# Patient Record
Sex: MALE | Race: WHITE | Employment: OTHER | URBAN - METROPOLITAN AREA
[De-identification: names, ages, dates, MRNs, and addresses within clinical notes are randomized per-mention and may not be internally consistent; named-entity substitution may affect disease eponyms.]

---

## 2017-02-27 ENCOUNTER — OFFICE VISIT (OUTPATIENT)
Dept: CARDIOLOGY CLINIC | Age: 72
End: 2017-02-27

## 2017-02-27 VITALS
DIASTOLIC BLOOD PRESSURE: 70 MMHG | BODY MASS INDEX: 28.28 KG/M2 | WEIGHT: 202 LBS | HEART RATE: 67 BPM | HEIGHT: 71 IN | SYSTOLIC BLOOD PRESSURE: 120 MMHG

## 2017-02-27 DIAGNOSIS — I10 ESSENTIAL HYPERTENSION, BENIGN: ICD-10-CM

## 2017-02-27 DIAGNOSIS — I25.10 ATHEROSCLEROSIS OF NATIVE CORONARY ARTERY OF NATIVE HEART WITHOUT ANGINA PECTORIS: Primary | ICD-10-CM

## 2017-02-27 DIAGNOSIS — E78.5 HYPERLIPIDEMIA, UNSPECIFIED HYPERLIPIDEMIA TYPE: ICD-10-CM

## 2017-02-27 DIAGNOSIS — I25.2 OLD MYOCARDIAL INFARCTION: ICD-10-CM

## 2017-02-27 DIAGNOSIS — I35.9 AORTIC VALVE DISORDER: ICD-10-CM

## 2017-02-27 DIAGNOSIS — I42.9 CARDIOMYOPATHY (HCC): ICD-10-CM

## 2017-02-27 NOTE — MR AVS SNAPSHOT
Visit Information Date & Time Provider Department Dept. Phone Encounter #  
 2/27/2017  8:45 AM Ramez Velazquez MD [facility] [phone] Follow-up Instructions Return in about 6 months (around 8/27/2017). Your Appointments 8/14/2017  8:30 AM  
Follow Up with Ramez Velazquez MD  
[facility] ([facility]) Appt Note: 6 month follow up  
 [address] [facility] [place] [address]  
  
   
 [address] [id number] [address] [address] Upcoming Health Maintenance Date Due Hepatitis C Screening 1945 DTaP/Tdap/Td series (1 - Tdap) 7/14/1968 FOBT Q 1 YEAR AGE 50-75 7/14/1997 ZOSTER VACCINE AGE 60> 7/14/2007 GLAUCOMA SCREENING Q2Y 7/14/2012 Pneumococcal 65+ Low/Medium Risk (1 of 2 - PCV13) 7/14/2012 MEDICARE YEARLY EXAM 7/14/2012 INFLUENZA AGE 9 TO ADULT 8/1/2016 Allergies as of 2/27/2017  Review Complete On: 2/27/2017 By: Ramez Velazquez MD  
 No Known Allergies Current Immunizations  Never Reviewed No immunizations on file. Not reviewed this visit You Were Diagnosed With   
  
 Codes Comments Atherosclerosis of native coronary artery of native heart without angina pectoris    -  Primary ICD-10-CM: I25.10 ICD-9-CM: 414.01 stable Cardiomyopathy ([address]     ICD-10-CM: I42.9 ICD-9-CM: 425.4 ef improving Old myocardial infarction     ICD-10-CM: I25.2 ICD-9-CM: 180 Aortic valve disorder     ICD-10-CM: I35.9 ICD-9-CM: 424.1 ai better on last echo Hyperlipidemia, unspecified hyperlipidemia type     ICD-10-CM: E78.5 ICD-9-CM: 272.4 Essential hypertension, benign     ICD-10-CM: I10 
ICD-9-CM: 401.1 controlled Vitals BP  
  
  
  
  
  
 120/70 BMI and BSA Data Body Mass Index Body Surface Area  
 28.17 kg/m 2 2.14 m 2 Preferred Pharmacy Pharmacy Name Phone [facility], [address] [phone] Your Updated Medication List  
  
   
This list is accurate as of: 2/27/17  9:14 AM.  Always use your most recent med list.  
  
  
  
  
 atorvastatin 40 mg tablet Commonly known as:  LIPITOR Take 1 Tab by mouth daily. carvedilol 25 mg tablet Commonly known as:  Zainab Locket Take 1 Tab by mouth two (2) times daily (with meals). clopidogrel 75 mg Tab Commonly known as:  PLAVIX Take 1 Tab by mouth daily. FISH OIL CONCENTRATE Cap Generic drug:  omega-3 fatty acids Take  by mouth.  
  
 lisinopril 20 mg tablet Commonly known as:  PRINIVIL, ZESTRIL  
take 1 tablet by mouth twice a day Follow-up Instructions Return in about 6 months (around 8/27/2017). Introducing [facility]! Jenny March introduces LoopIt patient portal. Now you can access parts of your medical record, email your doctor's office, and request medication refills online. 1. In your internet browser, go to [email or web address] 2. Click on the First Time User? Click Here link in the Sign In box. You will see the New Member Sign Up page. 3. Enter your LoopIt Access Code exactly as it appears below. You will not need to use this code after youve completed the sign-up process. If you do not sign up before the expiration date, you must request a new code. · LoopIt Access Code: [email or web address] Expires: 5/28/2017  8:40 AM 
 
4. Enter the last four digits of your Social Security Number (xxxx) and Date of Birth (mm/dd/yyyy) as indicated and click Submit. You will be taken to the next sign-up page. 5. Create a Tk20t ID. This will be your LoopIt login ID and cannot be changed, so think of one that is secure and easy to remember. 6. Create a LoopIt password. You can change your password at any time. 7. Enter your Password Reset Question and Answer.  This can be used at a later time if you forget your password. 8. Enter your e-mail address. You will receive e-mail notification when new information is available in [address] 9. Click Sign Up. You can now view and download portions of your medical record. 10. Click the Download Summary menu link to download a portable copy of your medical information. If you have questions, please visit the Frequently Asked Questions section of the [employer] website. Remember, [employer]aderTools is NOT to be used for urgent needs. For medical emergencies, dial 911. Now available from your iPhone and Android! Please provide this summary of care documentation to your next provider. Your primary care clinician is listed as Britney Ireland. If you have any questions after today's visit, please call [phone].

## 2017-02-27 NOTE — PROGRESS NOTES
HISTORY OF PRESENT ILLNESS  Xavier Hutchinson is a 73 y.o. male. HPI Comments: Patient with cad,cmp,old pci. On follow up patient denies any chest pains,sob, palpitation or other significant symptoms. Valvular Heart Disease   The history is provided by the patient. This is a chronic problem. The problem occurs constantly. The problem has not changed since onset. Pertinent negatives include no chest pain, no abdominal pain, no headaches and no shortness of breath. Review of Systems   Constitutional: Negative for chills and fever. HENT: Negative for nosebleeds. Eyes: Negative for blurred vision and double vision. Respiratory: Negative for cough, hemoptysis, sputum production, shortness of breath and wheezing. Cardiovascular: Negative for chest pain, palpitations, orthopnea, claudication, leg swelling and PND. Gastrointestinal: Negative for abdominal pain, heartburn, nausea and vomiting. Musculoskeletal: Negative for myalgias. Skin: Negative for rash. Neurological: Negative for dizziness, weakness and headaches. Endo/Heme/Allergies: Does not bruise/bleed easily. No family history on file. Past Medical History:   Diagnosis Date    Aortic valve disorders     Mild to moderate.  Coronary atherosclerosis of native coronary artery     Stable, old MI, old LAD, PCI.  Essential hypertension, benign     Stable.  Old myocardial infarction     Other and unspecified hyperlipidemia     LDL less than 100.  Other primary cardiomyopathies [facility])        Past Surgical History:   Procedure Laterality Date    HX CORONARY STENT PLACEMENT  2004    Prox LAD stent, Mid LAD PTCA       No Known Allergies    Current Outpatient Prescriptions   Medication Sig    clopidogrel (PLAVIX) 75 mg tablet Take 1 Tab by mouth daily.  carvedilol (COREG) 25 mg tablet Take 1 Tab by mouth two (2) times daily (with meals).     lisinopril (PRINIVIL, ZESTRIL) 20 mg tablet take 1 tablet by mouth twice a day    atorvastatin (LIPITOR) 40 mg tablet Take 1 Tab by mouth daily.  omega-3 fatty acids (FISH OIL CONCENTRATE) cap Take  by mouth. No current facility-administered medications for this visit. Visit Vitals    /70    Pulse 67    Ht 5' 11\" (1.803 m)    Wt 91.6 kg (202 lb)    BMI 28.17 kg/m2         Physical Exam   Constitutional: He is oriented to person, place, and time. He appears well-developed and well-nourished. HENT:   Head: Normocephalic and atraumatic. Eyes: Conjunctivae are normal.   Neck: Neck supple. No JVD present. No tracheal deviation present. No thyromegaly present. Cardiovascular: Normal rate, regular rhythm and normal heart sounds. Exam reveals no gallop and no friction rub. No murmur heard. Pulmonary/Chest: Breath sounds normal. No respiratory distress. He has no wheezes. He has no rales. He exhibits no tenderness. Abdominal: Soft. There is no tenderness. Musculoskeletal: He exhibits no edema. Neurological: He is alert and oriented to person, place, and time. Skin: Skin is warm and dry. Psychiatric: He has a normal mood and affect. Mr. Татьяна Sewell has a reminder for a \"due or due soon\" health maintenance. I have asked that he contact his primary care provider for follow-up on this health maintenance. CARDIOLOGY STUDIES 9/1/2011 5/1/2011 4/1/2007 10/1/2005 8/1/2004 4/1/2004   Myocardial Perfusion Scan Result - Abn scan, large fixed defect involving anterior, apical, and septal area, EF 55%. - - Abn scan, fixed anteroseptal apical and part of inferior wall defect, EF 34% -   Cardiac Cath with PCI Result - - - - - Taxus stent in left anterior descending and a balloon angio in mid left anterior descending CA. Echocardiogram - Complete Result EF 45-50%, mild/mod AR.  EF 39%, mild AR, mild TR, trace MR Dilated LV, EF 45% EF 42%, PAP 29, mild AR, mild MR, mild TR EF 45% -     SUMMARY:8/2014  Procedure information: Intravenous contrast (Definity, 3) was administered  to evaluate myocardial perfusion and opacify the left ventricle. Left ventricle: The ventricle was moderately dilated. Systolic function  was moderately reduced. Ejection fraction was estimated in the range of 30  % to 35 %. There was moderate diffuse hypokinesis. Features were  consistent with a pseudonormal left ventricular filling pattern, with  concomitant abnormal relaxation and increased filling pressure (grade 2  diastolic dysfunction). AORTIC VALVE: The valve was trileaflet. Leaflets exhibited normal  thickness. DOPPLER: There was mild regurgitation. I Have personally reviewed recent relevant labs available and discussed with patient  8/2015-lipid,lft,8/2016    SUMMARY:echo-8/2016  Left ventricle: The ventricle was moderately dilated. Systolic function  was mildly reduced. Ejection fraction was estimated in the range of 40 %  to 45 %. There were no regional wall motion abnormalities. There was mild  diffuse hypokinesis. Doppler parameters were consistent with abnormal left  ventricular relaxation (grade 1 diastolic dysfunction). Left atrium: The atrium was mildly dilated. Mitral valve: There was mild annular calcification. There was trivial  regurgitation. Tricuspid valve: Pulmonary artery systolic pressure: 19 mmHg. Aorta, systemic arteries: The root exhibited mild dilatation. There was  mild dilatation of the ascending aorta. The aortic root diameter was 41  mm. The ascending aortic AP dimension was 39 mm. Assessment       ICD-10-CM ICD-9-CM    1. Atherosclerosis of native coronary artery of native heart without angina pectoris I25.10 414.01     stable   2. Cardiomyopathy ([address] I42.9 425.4     ef improving   3. Old myocardial infarction I25.2 412    4. Aortic valve disorder I35.9 424.1     ai better on last echo   5. Hyperlipidemia, unspecified hyperlipidemia type E78.5 272.4    6.  Essential hypertension, benign I10 401.1     controlled   Patient does not want icd at this time-lv function improving    There are no discontinued medications. No orders of the defined types were placed in this encounter. Follow-up Disposition:  Return in about 6 months (around 8/27/2017).

## 2017-02-27 NOTE — PROGRESS NOTES
1. Have you been to the ER, urgent care clinic since your last visit? Hospitalized since your last visit? No    2. Have you seen or consulted any other health care providers outside of the [address] [place] [address] since your last visit? Include any pap smears or colon screening. Yes Where: pcp     3. Since your last visit, have you had any of the following symptoms? no         4. Have you had any blood work, X-rays or cardiac testing? No         5. Where do you normally have your labs drawn? 6. Do you need any refills today?    no

## 2017-02-27 NOTE — LETTER
Divya Alex 1945 2/27/2017 Dear Luca Anthony MD 
 
I had the pleasure of evaluating  Mr. Julio César Culver in office today. Below are the relevant portions of my assessment and plan of care. ICD-10-CM ICD-9-CM 1. Atherosclerosis of native coronary artery of native heart without angina pectoris I25.10 414.01   
 stable 2. Cardiomyopathy ([address] I42.9 425.4   
 ef improving 3. Old myocardial infarction I25.2 412   
4. Aortic valve disorder I35.9 424.1   
 ai better on last echo 5. Hyperlipidemia, unspecified hyperlipidemia type E78.5 272.4 6. Essential hypertension, benign I10 401.1   
 controlled Current Outpatient Prescriptions Medication Sig Dispense Refill  clopidogrel (PLAVIX) 75 mg tablet Take 1 Tab by mouth daily. 90 Tab 3  carvedilol (COREG) 25 mg tablet Take 1 Tab by mouth two (2) times daily (with meals). 180 Tab 3  
 lisinopril (PRINIVIL, ZESTRIL) 20 mg tablet take 1 tablet by mouth twice a day 180 Tab 2  
 atorvastatin (LIPITOR) 40 mg tablet Take 1 Tab by mouth daily. 90 Tab 3  
 omega-3 fatty acids (FISH OIL CONCENTRATE) cap Take  by mouth. No orders of the defined types were placed in this encounter. If you have questions, please do not hesitate to call me. I look forward to following Mr. Julio César Culver along with you. Sincerely, Kenzie Frey MD

## 2017-03-23 DIAGNOSIS — E78.5 HYPERLIPIDEMIA, UNSPECIFIED HYPERLIPIDEMIA TYPE: ICD-10-CM

## 2017-03-24 RX ORDER — ATORVASTATIN CALCIUM 40 MG/1
TABLET, FILM COATED ORAL
Qty: 90 TAB | Refills: 3 | Status: SHIPPED | OUTPATIENT
Start: 2017-03-24 | End: 2018-03-02 | Stop reason: SDUPTHER

## 2017-05-16 RX ORDER — LISINOPRIL 20 MG/1
TABLET ORAL
Qty: 180 TAB | Refills: 6 | Status: SHIPPED | OUTPATIENT
Start: 2017-05-16 | End: 2018-05-16 | Stop reason: SDUPTHER

## 2017-06-19 ENCOUNTER — TELEPHONE (OUTPATIENT)
Dept: CARDIOLOGY CLINIC | Age: 72
End: 2017-06-19

## 2017-06-19 NOTE — TELEPHONE ENCOUNTER
Patient called and stated he would like to know if it ok to take benadryl with his heart mediations. Please advise.

## 2017-08-14 ENCOUNTER — OFFICE VISIT (OUTPATIENT)
Dept: CARDIOLOGY CLINIC | Age: 72
End: 2017-08-14

## 2017-08-14 VITALS
HEIGHT: 71 IN | SYSTOLIC BLOOD PRESSURE: 134 MMHG | WEIGHT: 204 LBS | BODY MASS INDEX: 28.56 KG/M2 | DIASTOLIC BLOOD PRESSURE: 74 MMHG | HEART RATE: 65 BPM

## 2017-08-14 DIAGNOSIS — I35.9 AORTIC VALVE DISORDER: ICD-10-CM

## 2017-08-14 DIAGNOSIS — I42.9 CARDIOMYOPATHY, UNSPECIFIED TYPE (HCC): ICD-10-CM

## 2017-08-14 DIAGNOSIS — I10 ESSENTIAL HYPERTENSION, BENIGN: ICD-10-CM

## 2017-08-14 DIAGNOSIS — E78.5 HYPERLIPIDEMIA, UNSPECIFIED HYPERLIPIDEMIA TYPE: ICD-10-CM

## 2017-08-14 DIAGNOSIS — I25.10 ATHEROSCLEROSIS OF NATIVE CORONARY ARTERY OF NATIVE HEART WITHOUT ANGINA PECTORIS: Primary | ICD-10-CM

## 2017-08-14 DIAGNOSIS — I25.2 OLD MYOCARDIAL INFARCTION: ICD-10-CM

## 2017-08-14 RX ORDER — CARVEDILOL 25 MG/1
25 TABLET ORAL 2 TIMES DAILY WITH MEALS
Qty: 180 TAB | Refills: 3 | Status: SHIPPED | OUTPATIENT
Start: 2017-08-14 | End: 2018-09-06 | Stop reason: SDUPTHER

## 2017-08-14 RX ORDER — CLOPIDOGREL BISULFATE 75 MG/1
75 TABLET ORAL DAILY
Qty: 90 TAB | Refills: 3 | Status: SHIPPED | OUTPATIENT
Start: 2017-08-14 | End: 2018-03-02 | Stop reason: SDUPTHER

## 2017-08-14 NOTE — PROGRESS NOTES
HISTORY OF PRESENT ILLNESS  Sukh Bliss is a 81 y.o. male. HPI Comments: Patient with cad,cmp,old pci. On follow up patient denies any chest pains,sob, palpitation or other significant symptoms. Hypertension   The history is provided by the patient. This is a chronic problem. The problem occurs constantly. The problem has not changed since onset. Pertinent negatives include no chest pain, no abdominal pain, no headaches and no shortness of breath. Cholesterol Problem   Pertinent negatives include no chest pain, no abdominal pain, no headaches and no shortness of breath. Valvular Heart Disease   The history is provided by the patient. This is a chronic problem. The problem occurs constantly. The problem has not changed since onset. Pertinent negatives include no chest pain, no abdominal pain, no headaches and no shortness of breath. Review of Systems   Constitutional: Negative for chills and fever. HENT: Negative for nosebleeds. Eyes: Negative for blurred vision and double vision. Respiratory: Negative for cough, hemoptysis, sputum production, shortness of breath and wheezing. Cardiovascular: Negative for chest pain, palpitations, orthopnea, claudication, leg swelling and PND. Gastrointestinal: Negative for abdominal pain, heartburn, nausea and vomiting. Musculoskeletal: Negative for myalgias. Skin: Negative for rash. Neurological: Negative for dizziness, weakness and headaches. Endo/Heme/Allergies: Does not bruise/bleed easily. No family history on file. Past Medical History:   Diagnosis Date    Aortic valve disorders     Mild to moderate.  Coronary atherosclerosis of native coronary artery     Stable, old MI, old LAD, PCI.  Essential hypertension, benign     Stable.  Old myocardial infarction     Other and unspecified hyperlipidemia     LDL less than 100.     Other primary cardiomyopathies [facility])        Past Surgical History:   Procedure Laterality Date    HX CORONARY STENT PLACEMENT  2004    Prox LAD stent, Mid LAD PTCA       No Known Allergies    Current Outpatient Prescriptions   Medication Sig    clopidogrel (PLAVIX) 75 mg tab Take 1 Tab by mouth daily.  carvedilol (COREG) 25 mg tablet Take 1 Tab by mouth two (2) times daily (with meals).  lisinopril (PRINIVIL, ZESTRIL) 20 mg tablet take 1 tablet by mouth twice a day    atorvastatin (LIPITOR) 40 mg tablet take 1 tablet by mouth once daily    omega-3 fatty acids (FISH OIL CONCENTRATE) cap Take  by mouth. No current facility-administered medications for this visit. Visit Vitals    /74    Pulse 65    Ht 5' 11\" (1.803 m)    Wt 92.5 kg (204 lb)    BMI 28.45 kg/m2         Physical Exam   Constitutional: He is oriented to person, place, and time. He appears well-developed and well-nourished. HENT:   Head: Normocephalic and atraumatic. Eyes: Conjunctivae are normal.   Neck: Neck supple. No JVD present. No tracheal deviation present. No thyromegaly present. Cardiovascular: Normal rate, regular rhythm and normal heart sounds. Exam reveals no gallop and no friction rub. No murmur heard. Pulmonary/Chest: Breath sounds normal. No respiratory distress. He has no wheezes. He has no rales. He exhibits no tenderness. Abdominal: Soft. There is no tenderness. Musculoskeletal: He exhibits no edema. Neurological: He is alert and oriented to person, place, and time. Skin: Skin is warm and dry. Psychiatric: He has a normal mood and affect. Mr. Alexander Shepherd has a reminder for a \"due or due soon\" health maintenance. I have asked that he contact his primary care provider for follow-up on this health maintenance. CARDIOLOGY STUDIES 9/1/2011 5/1/2011 4/1/2007 10/1/2005 8/1/2004 4/1/2004   Myocardial Perfusion Scan Result - Abn scan, large fixed defect involving anterior, apical, and septal area, EF 55%.  - - Abn scan, fixed anteroseptal apical and part of inferior wall defect, EF 34% - Cardiac Cath with PCI Result - - - - - Taxus stent in left anterior descending and a balloon angio in mid left anterior descending CA. Echocardiogram - Complete Result EF 45-50%, mild/mod AR. EF 39%, mild AR, mild TR, trace MR Dilated LV, EF 45% EF 42%, PAP 29, mild AR, mild MR, mild TR EF 45% -     SUMMARY:8/2014  Procedure information: Intravenous contrast (Definity, 3) was administered  to evaluate myocardial perfusion and opacify the left ventricle. Left ventricle: The ventricle was moderately dilated. Systolic function  was moderately reduced. Ejection fraction was estimated in the range of 30  % to 35 %. There was moderate diffuse hypokinesis. Features were  consistent with a pseudonormal left ventricular filling pattern, with  concomitant abnormal relaxation and increased filling pressure (grade 2  diastolic dysfunction). AORTIC VALVE: The valve was trileaflet. Leaflets exhibited normal  thickness. DOPPLER: There was mild regurgitation. I Have personally reviewed recent relevant labs available and discussed with patient  8/2015-lipid,lft,8/2016    SUMMARY:echo-8/2016  Left ventricle: The ventricle was moderately dilated. Systolic function  was mildly reduced. Ejection fraction was estimated in the range of 40 %  to 45 %. There were no regional wall motion abnormalities. There was mild  diffuse hypokinesis. Doppler parameters were consistent with abnormal left  ventricular relaxation (grade 1 diastolic dysfunction). Left atrium: The atrium was mildly dilated. Mitral valve: There was mild annular calcification. There was trivial  regurgitation. Tricuspid valve: Pulmonary artery systolic pressure: 19 mmHg. Aorta, systemic arteries: The root exhibited mild dilatation. There was  mild dilatation of the ascending aorta. The aortic root diameter was 41  mm. The ascending aortic AP dimension was 39 mm. Assessment       ICD-10-CM ICD-9-CM    1.  Atherosclerosis of native coronary artery of native heart without angina pectoris I25.10 414.01 CBC WITH AUTOMATED DIFF      METABOLIC PANEL, BASIC    stable   2. Cardiomyopathy, unspecified type (HCC) I42.9 425.4     stable  mild   3. Essential hypertension, benign I10 401.1     controlled   4. Hyperlipidemia, unspecified hyperlipidemia type E78.5 272.4 LIPID PANEL      HEPATIC FUNCTION PANEL    stable   5. Aortic valve disorder I35.9 424.1     stable  asymptomatic   6. Old myocardial infarction I25.2 412    Patient does not want icd at this time-lv function improving    Medications Discontinued During This Encounter   Medication Reason    clopidogrel (PLAVIX) 75 mg tablet Reorder    carvedilol (COREG) 25 mg tablet Reorder       Orders Placed This Encounter    LIPID PANEL     Standing Status:   Future     Standing Expiration Date:   2/12/2018    HEPATIC FUNCTION PANEL     Standing Status:   Future     Standing Expiration Date:   2/12/2018    CBC WITH AUTOMATED DIFF     Standing Status:   Future     Standing Expiration Date:   8/49/6667    METABOLIC PANEL, BASIC     Standing Status:   Future     Standing Expiration Date:   9/13/2017    clopidogrel (PLAVIX) 75 mg tab     Sig: Take 1 Tab by mouth daily. Dispense:  90 Tab     Refill:  3    carvedilol (COREG) 25 mg tablet     Sig: Take 1 Tab by mouth two (2) times daily (with meals). Dispense:  180 Tab     Refill:  3       Follow-up Disposition:  Return in about 6 months (around 2/14/2018).

## 2017-08-14 NOTE — PROGRESS NOTES
1. Have you been to the ER, urgent care clinic since your last visit? Hospitalized since your last visit?    no    2. Have you seen or consulted any other health care providers outside of the [address] [place] [address] since your last visit? Include any pap smears or colon screening. no    3. Since your last visit, have you had any of the following symptoms?          no cardiac symptoms    4. Have you had any blood work, X-rays or cardiac testing?    no           5. Where do you normally have your labs drawn? [email or web address]    6. Do you need any refills today?    yes

## 2017-08-14 NOTE — MR AVS SNAPSHOT
Visit Information Date & Time Provider Department Dept. Phone Encounter #  
 8/14/2017  8:30 AM Nissa Amos MD [facility] [phone] Follow-up Instructions Return in about 6 months (around 2/14/2018). Upcoming Health Maintenance Date Due Hepatitis C Screening 1945 DTaP/Tdap/Td series (1 - Tdap) 7/14/1968 FOBT Q 1 YEAR AGE 50-75 7/14/1997 ZOSTER VACCINE AGE 60> 5/14/2007 GLAUCOMA SCREENING Q2Y 7/14/2012 Pneumococcal 65+ Low/Medium Risk (1 of 2 - PCV13) 7/14/2012 MEDICARE YEARLY EXAM 7/14/2012 INFLUENZA AGE 9 TO ADULT 8/1/2017 Allergies as of 8/14/2017  Review Complete On: 8/14/2017 By: Nissa Amos MD  
 No Known Allergies Current Immunizations  Never Reviewed No immunizations on file. Not reviewed this visit You Were Diagnosed With   
  
 Codes Comments Atherosclerosis of native coronary artery of native heart without angina pectoris    -  Primary ICD-10-CM: I25.10 ICD-9-CM: 414.01 stable Cardiomyopathy, unspecified type ([facility]     ICD-10-CM: I42.9 ICD-9-CM: 425.4 stable 
mild Essential hypertension, benign     ICD-10-CM: I10 
ICD-9-CM: 401.1 controlled Hyperlipidemia, unspecified hyperlipidemia type     ICD-10-CM: E78.5 ICD-9-CM: 272.4 stable Aortic valve disorder     ICD-10-CM: I35.9 ICD-9-CM: 424.1 stable 
asymptomatic Old myocardial infarction     ICD-10-CM: I25.2 ICD-9-CM: 524 Vitals BP Pulse Height(growth percentile) Weight(growth percentile) BMI Smoking Status 134/74 65 5' 11\" (1.803 m) 204 lb (92.5 kg) 28.45 kg/m2 Never Smoker Vitals History BMI and BSA Data Body Mass Index Body Surface Area  
 28.45 kg/m 2 2.15 m 2 Preferred Pharmacy Pharmacy Name Phone [facility] [address] [phone] Your Updated Medication List  
  
   
 This list is accurate as of: 8/14/17  8:55 AM.  Always use your most recent med list.  
  
  
  
  
 atorvastatin 40 mg tablet Commonly known as:  LIPITOR  
take 1 tablet by mouth once daily  
  
 carvedilol 25 mg tablet Commonly known as:  Yesica Salts Take 1 Tab by mouth two (2) times daily (with meals). clopidogrel 75 mg Tab Commonly known as:  PLAVIX Take 1 Tab by mouth daily. FISH OIL CONCENTRATE Cap Generic drug:  omega-3 fatty acids Take  by mouth.  
  
 lisinopril 20 mg tablet Commonly known as:  PRINIVIL, ZESTRIL  
take 1 tablet by mouth twice a day Prescriptions Sent to Pharmacy Refills  
 clopidogrel (PLAVIX) 75 mg tab 3 Sig: Take 1 Tab by mouth daily. Class: Normal  
 Pharmacy: [id number] [place] [address],[address], [address] Ph #: [phone] Route: Oral  
 carvedilol (COREG) 25 mg tablet 3 Sig: Take 1 Tab by mouth two (2) times daily (with meals). Class: Normal  
 Pharmacy: [id number] [place] [address],[address], [address] Ph #: [phone] Route: Oral  
  
Follow-up Instructions Return in about 6 months (around 2/14/2018). To-Do List   
 08/14/2017 Lab:  HEPATIC FUNCTION PANEL   
  
 08/14/2017 Lab:  LIPID PANEL   
  
 08/21/2017 Lab:  CBC WITH AUTOMATED DIFF   
  
 08/21/2017 Lab:  METABOLIC PANEL, BASIC Introducing [facility]! Brittnee Crenshaw introduces LATTO patient portal. Now you can access parts of your medical record, email your doctor's office, and request medication refills online. 1. In your internet browser, go to [email or web address] 2. Click on the First Time User? Click Here link in the Sign In box. You will see the New Member Sign Up page. 3. Enter your LATTO Access Code exactly as it appears below. You will not need to use this code after youve completed the sign-up process.  If you do not sign up before the expiration date, you must request a new code. · BackTrack Access Code: [email or web address] Expires: 11/12/2017  8:55 AM 
 
4. Enter the last four digits of your Social Security Number (xxxx) and Date of Birth (mm/dd/yyyy) as indicated and click Submit. You will be taken to the next sign-up page. 5. Create a BackTrack ID. This will be your BackTrack login ID and cannot be changed, so think of one that is secure and easy to remember. 6. Create a BackTrack password. You can change your password at any time. 7. Enter your Password Reset Question and Answer. This can be used at a later time if you forget your password. 8. Enter your e-mail address. You will receive e-mail notification when new information is available in [address] 9. Click Sign Up. You can now view and download portions of your medical record. 10. Click the Download Summary menu link to download a portable copy of your medical information. If you have questions, please visit the Frequently Asked Questions section of the BackTrack website. Remember, BackTrack is NOT to be used for urgent needs. For medical emergencies, dial 911. Now available from your iPhone and Android! Please provide this summary of care documentation to your next provider. Your primary care clinician is listed as Micaela Fu. If you have any questions after today's visit, please call [phone].

## 2017-08-14 NOTE — LETTER
[place] Alyx 1945 8/14/2017 Dear Elie Perez MD 
 
I had the pleasure of evaluating  Mr. Nando Dior in office today. Below are the relevant portions of my assessment and plan of care. ICD-10-CM ICD-9-CM 1. Atherosclerosis of native coronary artery of native heart without angina pectoris I25.10 414.01 CBC WITH AUTOMATED DIFF  
   METABOLIC PANEL, BASIC  
 stable 2. Cardiomyopathy, unspecified type ([address] I42.9 425.4   
 stable 
mild 3. Essential hypertension, benign I10 401.1   
 controlled 4. Hyperlipidemia, unspecified hyperlipidemia type E78.5 272.4 LIPID PANEL  
   HEPATIC FUNCTION PANEL  
 stable 5. Aortic valve disorder I35.9 424.1   
 stable 
asymptomatic 6. Old myocardial infarction I25.2 412 Current Outpatient Prescriptions Medication Sig Dispense Refill  clopidogrel (PLAVIX) 75 mg tab Take 1 Tab by mouth daily. 90 Tab 3  carvedilol (COREG) 25 mg tablet Take 1 Tab by mouth two (2) times daily (with meals). 180 Tab 3  
 lisinopril (PRINIVIL, ZESTRIL) 20 mg tablet take 1 tablet by mouth twice a day 180 Tab 6  
 atorvastatin (LIPITOR) 40 mg tablet take 1 tablet by mouth once daily 90 Tab 3  
 omega-3 fatty acids (FISH OIL CONCENTRATE) cap Take  by mouth. Orders Placed This Encounter  LIPID PANEL Standing Status:   Future Standing Expiration Date:   2/12/2018  
 HEPATIC FUNCTION PANEL Standing Status:   Future Standing Expiration Date:   2/12/2018  CBC WITH AUTOMATED DIFF Standing Status:   Future Standing Expiration Date:   9/13/2017  METABOLIC PANEL, BASIC Standing Status:   Future Standing Expiration Date:   9/13/2017  clopidogrel (PLAVIX) 75 mg tab Sig: Take 1 Tab by mouth daily. Dispense:  90 Tab Refill:  3  carvedilol (COREG) 25 mg tablet Sig: Take 1 Tab by mouth two (2) times daily (with meals). Dispense:  180 Tab Refill:  3 If you have questions, please do not hesitate to call me. I look forward to following . Jane Callaway along with you. Sincerely, Shivam Matos MD

## 2017-08-17 RX ORDER — CLOPIDOGREL BISULFATE 75 MG/1
TABLET ORAL
Qty: 90 TAB | Refills: 3 | Status: SHIPPED | OUTPATIENT
Start: 2017-08-17 | End: 2018-11-07 | Stop reason: SDUPTHER

## 2017-08-21 LAB
A-G RATIO,AGRAT: 1.1 RATIO (ref 1.1–2.6)
ABSOLUTE LYMPHOCYTE COUNT, 10803: 0.8 K/UL (ref 1–4.8)
ALBUMIN SERPL-MCNC: 3.6 G/DL (ref 3.5–5)
ALP SERPL-CCNC: 70 U/L (ref 40–125)
ALT SERPL-CCNC: 16 U/L (ref 5–40)
ANION GAP SERPL CALC-SCNC: 10.4 MMOL/L
AST SERPL W P-5'-P-CCNC: 20 U/L (ref 10–37)
BASOPHILS # BLD: 0 K/UL (ref 0–0.2)
BASOPHILS NFR BLD: 1 % (ref 0–2)
BILIRUB SERPL-MCNC: 0.5 MG/DL (ref 0.2–1.2)
BILIRUBIN, DIRECT,CBIL: <0.2 MG/DL (ref 0–0.3)
BUN SERPL-MCNC: 26 MG/DL (ref 6–22)
CALCIUM SERPL-MCNC: 8.8 MG/DL (ref 8.4–10.4)
CHLORIDE SERPL-SCNC: 107 MMOL/L (ref 98–110)
CHOLEST SERPL-MCNC: 134 MG/DL (ref 110–200)
CO2 SERPL-SCNC: 23 MMOL/L (ref 20–32)
CREAT SERPL-MCNC: 1.1 MG/DL (ref 0.8–1.6)
EOSINOPHIL # BLD: 0.2 K/UL (ref 0–0.5)
EOSINOPHIL NFR BLD: 3 % (ref 0–6)
ERYTHROCYTE [DISTWIDTH] IN BLOOD BY AUTOMATED COUNT: 13.9 % (ref 10–16)
GFRAA, 66117: >60
GFRNA, 66118: >60
GLOBULIN,GLOB: 3.2 G/DL (ref 2–4)
GLUCOSE SERPL-MCNC: 93 MG/DL (ref 65–99)
GRANULOCYTES,GRANS: 73 % (ref 40–75)
HCT VFR BLD AUTO: 41.7 % (ref 37.8–52.2)
HDLC SERPL-MCNC: 27 MG/DL (ref 40–59)
HGB BLD-MCNC: 13.5 G/DL (ref 12.6–17.1)
LDLC SERPL CALC-MCNC: 70 MG/DL (ref 50–99)
LYMPHOCYTES, LYMLT: 14 % (ref 27–45)
MCH RBC QN AUTO: 28 PG (ref 26–34)
MCHC RBC AUTO-ENTMCNC: 32 G/DL (ref 32–36)
MCV RBC AUTO: 85 FL (ref 80–95)
MONOCYTES # BLD: 0.6 K/UL (ref 0.1–0.9)
MONOCYTES NFR BLD: 10 % (ref 3–9)
NEUTROPHILS # BLD AUTO: 4.3 K/UL (ref 1.8–7.7)
PLATELET # BLD AUTO: 203 K/UL (ref 140–440)
PMV BLD AUTO: 11.7 FL (ref 6–10.8)
POTASSIUM SERPL-SCNC: 4.5 MMOL/L (ref 3.5–5.5)
PROT SERPL-MCNC: 6.8 G/DL (ref 6.2–8.1)
RBC # BLD AUTO: 4.91 M/UL (ref 3.8–5.8)
SODIUM SERPL-SCNC: 140 MMOL/L (ref 133–145)
TRIGL SERPL-MCNC: 188 MG/DL (ref 40–149)
VLDLC SERPL CALC-MCNC: 38 MG/DL (ref 8–30)
WBC # BLD AUTO: 5.9 K/UL (ref 4–11)

## 2018-03-02 ENCOUNTER — OFFICE VISIT (OUTPATIENT)
Dept: CARDIOLOGY CLINIC | Age: 73
End: 2018-03-02

## 2018-03-02 VITALS
BODY MASS INDEX: 28.98 KG/M2 | WEIGHT: 207 LBS | HEIGHT: 71 IN | SYSTOLIC BLOOD PRESSURE: 121 MMHG | DIASTOLIC BLOOD PRESSURE: 66 MMHG | HEART RATE: 63 BPM

## 2018-03-02 DIAGNOSIS — I10 ESSENTIAL HYPERTENSION, BENIGN: ICD-10-CM

## 2018-03-02 DIAGNOSIS — E78.5 HYPERLIPIDEMIA, UNSPECIFIED HYPERLIPIDEMIA TYPE: ICD-10-CM

## 2018-03-02 DIAGNOSIS — I42.9 CARDIOMYOPATHY, UNSPECIFIED TYPE (HCC): ICD-10-CM

## 2018-03-02 DIAGNOSIS — I25.2 OLD MYOCARDIAL INFARCTION: ICD-10-CM

## 2018-03-02 DIAGNOSIS — I25.10 ATHEROSCLEROSIS OF NATIVE CORONARY ARTERY OF NATIVE HEART WITHOUT ANGINA PECTORIS: Primary | ICD-10-CM

## 2018-03-02 RX ORDER — ATORVASTATIN CALCIUM 40 MG/1
TABLET, FILM COATED ORAL
Qty: 90 TAB | Refills: 3 | Status: SHIPPED | OUTPATIENT
Start: 2018-03-02 | End: 2019-03-19 | Stop reason: SDUPTHER

## 2018-03-02 NOTE — PROGRESS NOTES
HISTORY OF PRESENT ILLNESS  Awilda Carty is a 81 y.o. male. HPI Comments: Patient with cad,cmp,old pci. On follow up patient denies any chest pains,sob, palpitation or other significant symptoms. Hypertension   The history is provided by the patient. This is a chronic problem. The problem occurs constantly. The problem has not changed since onset. Pertinent negatives include no chest pain, no abdominal pain, no headaches and no shortness of breath. Cholesterol Problem   The history is provided by the patient. This is a chronic problem. The problem occurs constantly. The problem has not changed since onset. Pertinent negatives include no chest pain, no abdominal pain, no headaches and no shortness of breath. Valvular Heart Disease   The history is provided by the patient. This is a chronic problem. The problem occurs constantly. The problem has not changed since onset. Pertinent negatives include no chest pain, no abdominal pain, no headaches and no shortness of breath. Review of Systems   Constitutional: Negative for chills and fever. HENT: Negative for nosebleeds. Eyes: Negative for blurred vision and double vision. Respiratory: Negative for cough, hemoptysis, sputum production, shortness of breath and wheezing. Cardiovascular: Negative for chest pain, palpitations, orthopnea, claudication, leg swelling and PND. Gastrointestinal: Negative for abdominal pain, heartburn, nausea and vomiting. Musculoskeletal: Negative for myalgias. Skin: Negative for rash. Neurological: Negative for dizziness, weakness and headaches. Endo/Heme/Allergies: Does not bruise/bleed easily. No family history on file. Past Medical History:   Diagnosis Date    Aortic valve disorders     Mild to moderate.  Coronary atherosclerosis of native coronary artery     Stable, old MI, old LAD, PCI.  Essential hypertension, benign     Stable.     Old myocardial infarction     Other and unspecified hyperlipidemia     LDL less than 100.  Other primary cardiomyopathies [facility])        Past Surgical History:   Procedure Laterality Date    HX CORONARY STENT PLACEMENT  2004    Prox LAD stent, Mid LAD PTCA       No Known Allergies    Current Outpatient Prescriptions   Medication Sig    atorvastatin (LIPITOR) 40 mg tablet take 1 tablet by mouth once daily    clopidogrel (PLAVIX) 75 mg tab take 1 tablet by mouth once daily    carvedilol (COREG) 25 mg tablet Take 1 Tab by mouth two (2) times daily (with meals).  lisinopril (PRINIVIL, ZESTRIL) 20 mg tablet take 1 tablet by mouth twice a day    omega-3 fatty acids (FISH OIL CONCENTRATE) cap Take  by mouth. No current facility-administered medications for this visit. Visit Vitals    /66    Pulse 63    Ht 5' 11\" (1.803 m)    Wt 93.9 kg (207 lb)    BMI 28.87 kg/m2         Physical Exam   Constitutional: He is oriented to person, place, and time. He appears well-developed and well-nourished. HENT:   Head: Normocephalic and atraumatic. Eyes: Conjunctivae are normal.   Neck: Neck supple. No JVD present. No tracheal deviation present. No thyromegaly present. Cardiovascular: Normal rate, regular rhythm and normal heart sounds. Exam reveals no gallop and no friction rub. No murmur heard. Pulmonary/Chest: Breath sounds normal. No respiratory distress. He has no wheezes. He has no rales. He exhibits no tenderness. Abdominal: Soft. There is no tenderness. Musculoskeletal: He exhibits no edema. Neurological: He is alert and oriented to person, place, and time. Skin: Skin is warm and dry. Psychiatric: He has a normal mood and affect. Mr. Sam Lanza has a reminder for a \"due or due soon\" health maintenance. I have asked that he contact his primary care provider for follow-up on this health maintenance.     CARDIOLOGY STUDIES 9/1/2011 5/1/2011 4/1/2007 10/1/2005 8/1/2004 4/1/2004   Myocardial Perfusion Scan Result - Abn scan, large fixed defect involving anterior, apical, and septal area, EF 55%. - - Abn scan, fixed anteroseptal apical and part of inferior wall defect, EF 34% -   Cardiac Cath with PCI Result - - - - - Taxus stent in left anterior descending and a balloon angio in mid left anterior descending CA. Echocardiogram - Complete Result EF 45-50%, mild/mod AR. EF 39%, mild AR, mild TR, trace MR Dilated LV, EF 45% EF 42%, PAP 29, mild AR, mild MR, mild TR EF 45% -   Some recent data might be hidden     SUMMARY:8/2014  Procedure information: Intravenous contrast (Definity, 3) was administered  to evaluate myocardial perfusion and opacify the left ventricle. Left ventricle: The ventricle was moderately dilated. Systolic function  was moderately reduced. Ejection fraction was estimated in the range of 30  % to 35 %. There was moderate diffuse hypokinesis. Features were  consistent with a pseudonormal left ventricular filling pattern, with  concomitant abnormal relaxation and increased filling pressure (grade 2  diastolic dysfunction). AORTIC VALVE: The valve was trileaflet. Leaflets exhibited normal  thickness. DOPPLER: There was mild regurgitation. I Have personally reviewed recent relevant labs available and discussed with patient  8/2015-lipid,lft,8/2016    SUMMARY:echo-8/2016  Left ventricle: The ventricle was moderately dilated. Systolic function  was mildly reduced. Ejection fraction was estimated in the range of 40 %  to 45 %. There were no regional wall motion abnormalities. There was mild  diffuse hypokinesis. Doppler parameters were consistent with abnormal left  ventricular relaxation (grade 1 diastolic dysfunction). Left atrium: The atrium was mildly dilated. Mitral valve: There was mild annular calcification. There was trivial  regurgitation. Tricuspid valve: Pulmonary artery systolic pressure: 19 mmHg. Aorta, systemic arteries: The root exhibited mild dilatation.  There was  mild dilatation of the ascending aorta. The aortic root diameter was 41  mm. The ascending aortic AP dimension was 39 mm. Assessment       ICD-10-CM ICD-9-CM    1. Atherosclerosis of native coronary artery of native heart without angina pectoris I25.10 414.01     stable     2. Hyperlipidemia, unspecified hyperlipidemia type E78.5 272.4 atorvastatin (LIPITOR) 40 mg tablet    ldl 70  controlled   3. Cardiomyopathy, unspecified type (HCC) I42.9 425.4     stable   4. Essential hypertension, benign I10 401.1     controlled   5. Old myocardial infarction I25.2 412     stable   Patient does not want icd at this time-lv function improving    Medications Discontinued During This Encounter   Medication Reason    clopidogrel (PLAVIX) 75 mg tab Duplicate Order    atorvastatin (LIPITOR) 40 mg tablet Reorder       Orders Placed This Encounter    atorvastatin (LIPITOR) 40 mg tablet     Sig: take 1 tablet by mouth once daily     Dispense:  90 Tab     Refill:  3       Follow-up Disposition:  Return in about 6 months (around 9/2/2018).

## 2018-03-02 NOTE — MR AVS SNAPSHOT
[facility] 
 
 
 [address] [facility] 
[phone] Patient: Audra Gill MRN: [id number] TGC:[id number] Visit Information Date & Time Provider Department Dept. Phone Encounter #  
 3/2/2018  9:30 AM Jose Fitzgerald MD [facility] [phone] [id number] Follow-up Instructions Return in about 6 months (around 9/2/2018). Your Appointments 9/13/2018  9:30 AM  
Follow Up with Jose Fitzgerald MD  
[facility] ([facility]) Appt Note: 6 month [address] [facility] [address]  
  
   
 [address] [place] [id number] Upcoming Health Maintenance Date Due Hepatitis C Screening 1945 DTaP/Tdap/Td series (1 - Tdap) 7/14/1968 FOBT Q 1 YEAR AGE 50-75 7/14/1997 ZOSTER VACCINE AGE 60> 5/14/2007 GLAUCOMA SCREENING Q2Y 7/14/2012 Pneumococcal 65+ Low/Medium Risk (1 of 2 - PCV13) 7/14/2012 MEDICARE YEARLY EXAM 7/14/2012 Influenza Age 5 to Adult 8/1/2017 Allergies as of 3/2/2018  Review Complete On: 3/2/2018 By: Jose Fitzgerald MD  
 No Known Allergies Current Immunizations  Never Reviewed No immunizations on file. Not reviewed this visit You Were Diagnosed With   
  
 Codes Comments Atherosclerosis of native coronary artery of native heart without angina pectoris    -  Primary ICD-10-CM: I25.10 ICD-9-CM: 414.01 stable Hyperlipidemia, unspecified hyperlipidemia type     ICD-10-CM: E78.5 ICD-9-CM: 272.4 ldl 70 
controlled Cardiomyopathy, unspecified type ([facility]     ICD-10-CM: I42.9 ICD-9-CM: 425.4 stable Essential hypertension, benign     ICD-10-CM: I10 
ICD-9-CM: 401.1 controlled Old myocardial infarction     ICD-10-CM: I25.2 ICD-9-CM: 412 stable Vitals BP Pulse Height(growth percentile) Weight(growth percentile) BMI Smoking Status 121/66 63 5' 11\" (1.803 m) 207 lb (93.9 kg) 28.87 kg/m2 Never Smoker Vitals History BMI and BSA Data Body Mass Index Body Surface Area  
 28.87 kg/m 2 2.17 m 2 Preferred Pharmacy Pharmacy Name Phone [facility], [address] [phone] Your Updated Medication List  
  
   
This list is accurate as of 3/2/18  9:43 AM.  Always use your most recent med list.  
  
  
  
  
 atorvastatin 40 mg tablet Commonly known as:  LIPITOR  
take 1 tablet by mouth once daily  
  
 carvedilol 25 mg tablet Commonly known as:  Fajardo Dunker Take 1 Tab by mouth two (2) times daily (with meals). clopidogrel 75 mg Tab Commonly known as:  PLAVIX  
take 1 tablet by mouth once daily FISH OIL CONCENTRATE Cap Generic drug:  omega-3 fatty acids Take  by mouth.  
  
 lisinopril 20 mg tablet Commonly known as:  PRINIVIL, ZESTRIL  
take 1 tablet by mouth twice a day Prescriptions Sent to Pharmacy Refills  
 atorvastatin (LIPITOR) 40 mg tablet 3 Sig: take 1 tablet by mouth once daily Class: Normal  
 Pharmacy: [id number] [place] [address],[address], [address] Ph #: [phone] Follow-up Instructions Return in about 6 months (around 9/2/2018). [facility]! [facility] introduces DoPay patient portal. Now you can access parts of your medical record, email your doctor's office, and request medication refills online. 1. In your internet browser, go to [email or web address]. [email or web address] 2. Click on the First Time User? Click Here link in the Sign In box. You will see the New Member Sign Up page. 3. Enter your DoPay Access Code exactly as it appears below. You will not need to use this code after youve completed the sign-up process. If you do not sign up before the expiration date, you must request a new code.  
 
· DoPay Access Code: [email or web address] 
 Expires: 5/31/2018  9:23 AM 
 
4. Enter the last four digits of your Social Security Number (xxxx) and Date of Birth (mm/dd/yyyy) as indicated and click Submit. You will be taken to the next sign-up page. 5. Create a [employer]Dem[employer] ID. This will be your [employer] login ID and cannot be changed, so think of one that is secure and easy to remember. 6. Create a [employer] password. You can change your password at any time. 7. Enter your Password Reset Question and Answer. This can be used at a later time if you forget your password. 8. Enter your e-mail address. You will receive e-mail notification when new information is available in [address] 9. Click Sign Up. You can now view and download portions of your medical record. 10. Click the Download Summary menu link to download a portable copy of your medical information. If you have questions, please visit the Frequently Asked Questions section of the [employer] website. Remember, OpDem[employer] is NOT to be used for urgent needs. For medical emergencies, dial 911. Now available from your iPhone and Android! Please provide this summary of care documentation to your next provider. Your primary care clinician is listed as Roxy Dawson. If you have any questions after today's visit, please call [phone].

## 2018-03-02 NOTE — PROGRESS NOTES
1. Have you been to the ER, urgent care clinic since your last visit? Hospitalized since your last visit? No    2. Have you seen or consulted any other health care providers outside of the [address] [place] [address] since your last visit? Include any pap smears or colon screening. No     3. Since your last visit, have you had any of the following symptoms? .          4. Have you had any blood work, X-rays or cardiac testing? No             5.  Where do you normally have your labs drawn? [facility]    6. Do you need any refills today?   No

## 2018-03-02 NOTE — LETTER
Doris Last 1945 
 
3/2/2018 Dear Ha Duran MD 
 
I had the pleasure of evaluating  Mr. Gisell Pozo in office today. Below are the relevant portions of my assessment and plan of care. ICD-10-CM ICD-9-CM 1. Atherosclerosis of native coronary artery of native heart without angina pectoris I25.10 414.01   
 stable 2. Hyperlipidemia, unspecified hyperlipidemia type E78.5 272.4 atorvastatin (LIPITOR) 40 mg tablet  
 ldl 70 
controlled 3. Cardiomyopathy, unspecified type ([address] I42.9 425.4   
 stable 4. Essential hypertension, benign I10 401.1   
 controlled 5. Old myocardial infarction I25.2 412   
 stable Current Outpatient Prescriptions Medication Sig Dispense Refill  atorvastatin (LIPITOR) 40 mg tablet take 1 tablet by mouth once daily 90 Tab 3  clopidogrel (PLAVIX) 75 mg tab take 1 tablet by mouth once daily 90 Tab 3  carvedilol (COREG) 25 mg tablet Take 1 Tab by mouth two (2) times daily (with meals). 180 Tab 3  
 lisinopril (PRINIVIL, ZESTRIL) 20 mg tablet take 1 tablet by mouth twice a day 180 Tab 6  
 omega-3 fatty acids (FISH OIL CONCENTRATE) cap Take  by mouth. Orders Placed This Encounter  atorvastatin (LIPITOR) 40 mg tablet Sig: take 1 tablet by mouth once daily Dispense:  90 Tab Refill:  3 If you have questions, please do not hesitate to call me. I look forward to following Mr. Gisell Pozo along with you. Sincerely, Judith Caro MD

## 2018-05-16 RX ORDER — LISINOPRIL 20 MG/1
TABLET ORAL
Qty: 180 TAB | Refills: 6 | Status: SHIPPED | OUTPATIENT
Start: 2018-05-16 | End: 2019-08-07 | Stop reason: SDUPTHER

## 2018-07-16 ENCOUNTER — TELEPHONE (OUTPATIENT)
Dept: CARDIOLOGY CLINIC | Age: 73
End: 2018-07-16

## 2018-07-16 NOTE — TELEPHONE ENCOUNTER
Spoke with patient and per Dr. Michael Cristina any tablet with omega 3 will work, check with pharmacist they will guide him which bottle. He voices understanding and acceptance of this advice and will call back if any further questions or concerns.

## 2018-07-16 NOTE — TELEPHONE ENCOUNTER
Patient called and stated that he has been taking extra fish oil and he stated he can not find them in the stores and he would like to know if there is another medication he can take in place of the fish oil.

## 2018-09-06 RX ORDER — CARVEDILOL 25 MG/1
TABLET ORAL
Qty: 180 TAB | Refills: 3 | Status: SHIPPED | OUTPATIENT
Start: 2018-09-06 | End: 2019-08-30 | Stop reason: SDUPTHER

## 2018-09-13 ENCOUNTER — OFFICE VISIT (OUTPATIENT)
Dept: CARDIOLOGY CLINIC | Age: 73
End: 2018-09-13

## 2018-09-13 VITALS
WEIGHT: 198 LBS | BODY MASS INDEX: 27.72 KG/M2 | SYSTOLIC BLOOD PRESSURE: 134 MMHG | DIASTOLIC BLOOD PRESSURE: 69 MMHG | HEART RATE: 63 BPM | HEIGHT: 71 IN

## 2018-09-13 DIAGNOSIS — I10 ESSENTIAL HYPERTENSION, BENIGN: ICD-10-CM

## 2018-09-13 DIAGNOSIS — E78.5 HYPERLIPIDEMIA, UNSPECIFIED HYPERLIPIDEMIA TYPE: ICD-10-CM

## 2018-09-13 DIAGNOSIS — I25.10 ATHEROSCLEROSIS OF NATIVE CORONARY ARTERY OF NATIVE HEART WITHOUT ANGINA PECTORIS: Primary | ICD-10-CM

## 2018-09-13 DIAGNOSIS — I35.9 AORTIC VALVE DISORDER: ICD-10-CM

## 2018-09-13 DIAGNOSIS — I42.9 CARDIOMYOPATHY, UNSPECIFIED TYPE (HCC): ICD-10-CM

## 2018-09-13 NOTE — MR AVS SNAPSHOT
[address] [facility] 
 
 
 [address] [facility] 
[phone] Patient: Nissa Woodward MRN: [id number] AJP:[id number] Visit Information Date & Time Provider Department Dept. Phone Encounter #  
 9/13/2018  9:30 AM Josh Montejo MD [facility] [phone] [id number] Follow-up Instructions Return in about 6 months (around 3/13/2019). Your Appointments 3/29/2019  8:30 AM  
Office Visit with Josh Montejo MD  
[facility] ([facility]) Appt Note: 6 month follow up/Lipid/LFT/BMP/LFT  
 [address] [facility] [address]  
  
   
 [address] [address] [address] [address] Upcoming Health Maintenance Date Due Hepatitis C Screening 1945 DTaP/Tdap/Td series (1 - Tdap) 7/14/1968 FOBT Q 1 YEAR AGE 50-75 7/14/1997 ZOSTER VACCINE AGE 60> 5/14/2007 GLAUCOMA SCREENING Q2Y 7/14/2012 Pneumococcal 65+ Low/Medium Risk (1 of 2 - PCV13) 7/14/2012 MEDICARE YEARLY EXAM 3/14/2018 Influenza Age 5 to Adult 8/1/2018 Allergies as of 9/13/2018  Review Complete On: 9/13/2018 By: Josh Montejo MD  
 No Known Allergies Current Immunizations  Never Reviewed No immunizations on file. Not reviewed this visit You Were Diagnosed With   
  
 Codes Comments Atherosclerosis of native coronary artery of native heart without angina pectoris    -  Primary ICD-10-CM: I25.10 ICD-9-CM: 414.01 stable 
continue treatment Cardiomyopathy, unspecified type ([facility]     ICD-10-CM: I42.9 ICD-9-CM: 425.4 stable Essential hypertension, benign     ICD-10-CM: I10 
ICD-9-CM: 401.1 stable Hyperlipidemia, unspecified hyperlipidemia type     ICD-10-CM: E78.5 ICD-9-CM: 272.4 on statin 
lab with pcp Aortic valve disorder     ICD-10-CM: I35.9 ICD-9-CM: 424.1 ai stable Vitals BP Pulse Height(growth percentile) Weight(growth percentile) BMI Smoking Status 134/69 63 5' 11\" (1.803 m) 198 lb (89.8 kg) 27.62 kg/m2 Never Smoker Vitals History BMI and BSA Data Body Mass Index Body Surface Area  
 27.62 kg/m 2 2.12 m 2 Preferred Pharmacy Pharmacy Name Phone [facility] [address] [facility], [address] [phone] Your Updated Medication List  
  
   
This list is accurate as of 9/13/18  9:44 AM.  Always use your most recent med list.  
  
  
  
  
 atorvastatin 40 mg tablet Commonly known as:  LIPITOR  
take 1 tablet by mouth once daily  
  
 carvedilol 25 mg tablet Commonly known as:  COREG  
take 1 tablet by mouth twice a day with meals  
  
 clopidogrel 75 mg Tab Commonly known as:  PLAVIX  
take 1 tablet by mouth once daily FISH OIL CONCENTRATE Cap Generic drug:  omega-3 fatty acids Take  by mouth.  
  
 lisinopril 20 mg tablet Commonly known as:  PRINIVIL, ZESTRIL  
take 1 tablet by mouth twice a day Follow-up Instructions Return in about 6 months (around 3/13/2019). To-Do List   
 09/13/2018 Lab:  HEPATIC FUNCTION PANEL   
  
 09/13/2018 Lab:  LIPID PANEL   
  
 09/20/2018 Lab:  CBC WITH AUTOMATED DIFF   
  
 09/20/2018 Lab:  METABOLIC PANEL, BASIC Introducing [facility] HE[facility]! Glenn Strauss introduces [employer] patient portal. Now you can access parts of your medical record, email your doctor's office, and request medication refills online. 1. In your internet browser, go to [email or web address] 2. Click on the First Time User? Click Here link in the Sign In box. You will see the New Member Sign Up page. 3. Enter your [employer] Access Code exactly as it appears below. You will not need to use this code after youve completed the sign-up process. If you do not sign up before the expiration date, you must request a new code. · [employer] Access Code: [email or web address] Expires: 12/12/2018  9:20 AM 
 
 4. Enter the last four digits of your Social Security Number (xxxx) and Date of Birth (mm/dd/yyyy) as indicated and click Submit. You will be taken to the next sign-up page. 5. Create a [employer] ID. This will be your [employer] login ID and cannot be changed, so think of one that is secure and easy to remember. 6. Create a [employer] password. You can change your password at any time. 7. Enter your Password Reset Question and Answer. This can be used at a later time if you forget your password. 8. Enter your e-mail address. You will receive e-mail notification when new information is available in [address] 9. Click Sign Up. You can now view and download portions of your medical record. 10. Click the Download Summary menu link to download a portable copy of your medical information. If you have questions, please visit the Frequently Asked Questions section of the [employer] website. Remember, [employer] is NOT to be used for urgent needs. For medical emergencies, dial 911. Now available from your iPhone and Android! Please provide this summary of care documentation to your next provider. Your primary care clinician is listed as Sonya Gomez. If you have any questions after today's visit, please call [phone].

## 2018-09-13 NOTE — PROGRESS NOTES
1. Have you been to the ER, urgent care clinic since your last visit? Hospitalized since your last visit? No 
 
2. Have you seen or consulted any other health care providers outside of the [address] [place] [address] since your last visit? Include any pap smears or colon screening. No  
 
3. Since your last visit, have you had any of the following symptoms? .  
 
   
 
4. Have you had any blood work, X-rays or cardiac testing? 5. Where do you normally have your labs drawn? 6. Do you need any refills today?

## 2018-09-13 NOTE — LETTER
Brian Amanda 1945 9/13/2018 Dear Sapphire Mead MD 
 
I had the pleasure of evaluating  Mr. Татьяна Sewell in office today. Below are the relevant portions of my assessment and plan of care. ICD-10-CM ICD-9-CM 1. Atherosclerosis of native coronary artery of native heart without angina pectoris B73.03 140.63 METABOLIC PANEL, BASIC  
   CBC WITH AUTOMATED DIFF  
   LIPID PANEL  
   HEPATIC FUNCTION PANEL  
 stable 
continue treatment 2. Cardiomyopathy, unspecified type ([address] A69.6 316.2 METABOLIC PANEL, BASIC  
   CBC WITH AUTOMATED DIFF  
   LIPID PANEL  
   HEPATIC FUNCTION PANEL  
 stable 3. Essential hypertension, benign I10 401.1   
 stable 4. Hyperlipidemia, unspecified hyperlipidemia type E78.5 272.4   
 on statin 
lab with pcp 5. Aortic valve disorder I35.9 424.1   
 ai stable Current Outpatient Prescriptions Medication Sig Dispense Refill  carvedilol (COREG) 25 mg tablet take 1 tablet by mouth twice a day with meals 180 Tab 3  
 lisinopril (PRINIVIL, ZESTRIL) 20 mg tablet take 1 tablet by mouth twice a day 180 Tab 6  
 atorvastatin (LIPITOR) 40 mg tablet take 1 tablet by mouth once daily 90 Tab 3  clopidogrel (PLAVIX) 75 mg tab take 1 tablet by mouth once daily 90 Tab 3  
 omega-3 fatty acids (FISH OIL CONCENTRATE) cap Take  by mouth. Orders Placed This Encounter  METABOLIC PANEL, BASIC Standing Status:   Future Standing Expiration Date:   10/13/2018  CBC WITH AUTOMATED DIFF Standing Status:   Future Standing Expiration Date:   10/13/2018  LIPID PANEL Standing Status:   Future Standing Expiration Date:   3/14/2019  
 HEPATIC FUNCTION PANEL Standing Status:   Future Standing Expiration Date:   3/14/2019 If you have questions, please do not hesitate to call me. I look forward to following Mr. Татьяна Sewell along with you. Sincerely, Yoana Mena MD

## 2018-09-13 NOTE — PROGRESS NOTES
HISTORY OF PRESENT ILLNESS Nakia Salter is a 72 y.o. male. HPI Comments: Patient with cad,cmp,old pci. On follow up patient denies any chest pains,sob, palpitation or other significant symptoms. Valvular Heart Disease The history is provided by the patient. This is a chronic problem. The problem occurs constantly. The problem has not changed since onset. Pertinent negatives include no chest pain, no abdominal pain, no headaches and no shortness of breath. Hypertension The history is provided by the patient. This is a chronic problem. The problem occurs constantly. The problem has not changed since onset. Pertinent negatives include no chest pain, no abdominal pain, no headaches and no shortness of breath. Cholesterol Problem The history is provided by the patient. This is a chronic problem. The problem occurs constantly. The problem has not changed since onset. Pertinent negatives include no chest pain, no abdominal pain, no headaches and no shortness of breath. Review of Systems Constitutional: Negative for chills and fever. HENT: Negative for nosebleeds. Eyes: Negative for blurred vision and double vision. Respiratory: Negative for cough, hemoptysis, sputum production, shortness of breath and wheezing. Cardiovascular: Negative for chest pain, palpitations, orthopnea, claudication, leg swelling and PND. Gastrointestinal: Negative for abdominal pain, heartburn, nausea and vomiting. Musculoskeletal: Negative for myalgias. Skin: Negative for rash. Neurological: Negative for dizziness, weakness and headaches. Endo/Heme/Allergies: Does not bruise/bleed easily. No family history on file. Past Medical History:  
Diagnosis Date  Aortic valve disorders Mild to moderate.  Coronary atherosclerosis of native coronary artery Stable, old MI, old LAD, PCI.  Essential hypertension, benign Stable.  Old myocardial infarction  Other and unspecified hyperlipidemia LDL less than 100.  Other primary cardiomyopathies Past Surgical History:  
Procedure Laterality Date  HX CORONARY STENT PLACEMENT  2004 Prox LAD stent, Mid LAD PTCA No Known Allergies Current Outpatient Prescriptions Medication Sig  carvedilol (COREG) 25 mg tablet take 1 tablet by mouth twice a day with meals  lisinopril (PRINIVIL, ZESTRIL) 20 mg tablet take 1 tablet by mouth twice a day  atorvastatin (LIPITOR) 40 mg tablet take 1 tablet by mouth once daily  clopidogrel (PLAVIX) 75 mg tab take 1 tablet by mouth once daily  omega-3 fatty acids (FISH OIL CONCENTRATE) cap Take  by mouth. No current facility-administered medications for this visit. Visit Vitals  /69  Pulse 63  Ht 5' 11\" (1.803 m)  Wt 89.8 kg (198 lb)  BMI 27.62 kg/m2 Physical Exam  
Constitutional: He is oriented to person, place, and time. He appears well-developed and well-nourished. HENT:  
Head: Normocephalic and atraumatic. Eyes: Conjunctivae are normal.  
Neck: Neck supple. No JVD present. No tracheal deviation present. No thyromegaly present. Cardiovascular: Normal rate, regular rhythm and normal heart sounds. Exam reveals no gallop and no friction rub. No murmur heard. Pulmonary/Chest: Breath sounds normal. No respiratory distress. He has no wheezes. He has no rales. He exhibits no tenderness. Abdominal: Soft. There is no tenderness. Musculoskeletal: He exhibits no edema. Neurological: He is alert and oriented to person, place, and time. Skin: Skin is warm and dry. Psychiatric: He has a normal mood and affect. Mr. Malou Otero has a reminder for a \"due or due soon\" health maintenance. I have asked that he contact his primary care provider for follow-up on this health maintenance. CARDIOLOGY STUDIES 9/1/2011 5/1/2011 4/1/2007 10/1/2005 8/1/2004 4/1/2004 Myocardial Perfusion Scan Result - Abn scan, large fixed defect involving anterior, apical, and septal area, EF 55%. - - Abn scan, fixed anteroseptal apical and part of inferior wall defect, EF 34% - Cardiac Cath with PCI Result - - - - - Taxus stent in left anterior descending and a balloon angio in mid left anterior descending CA. Echocardiogram - Complete Result EF 45-50%, mild/mod AR. EF 39%, mild AR, mild TR, trace MR Dilated LV, EF 45% EF 42%, PAP 29, mild AR, mild MR, mild TR EF 45% - Some recent data might be hidden [facility] Procedure information: Intravenous contrast (Definity, 3) was administered 
to evaluate myocardial perfusion and opacify the left ventricle. Left ventricle: The ventricle was moderately dilated. Systolic function 
was moderately reduced. Ejection fraction was estimated in the range of 30 
% to 35 %. There was moderate diffuse hypokinesis. Features were 
consistent with a pseudonormal left ventricular filling pattern, with 
concomitant abnormal relaxation and increased filling pressure (grade 2 
diastolic dysfunction). AORTIC VALVE: The valve was trileaflet. Leaflets exhibited normal 
thickness. DOPPLER: There was mild regurgitation. I Have personally reviewed recent relevant labs available and discussed with patient 8/2015-lipid,lft,8/2016 [facility] Left ventricle: The ventricle was moderately dilated. Systolic function 
was mildly reduced. Ejection fraction was estimated in the range of 40 % 
to 45 %. There were no regional wall motion abnormalities. There was mild 
diffuse hypokinesis. Doppler parameters were consistent with abnormal left 
ventricular relaxation (grade 1 diastolic dysfunction). Left atrium: The atrium was mildly dilated. Mitral valve: There was mild annular calcification. There was trivial 
regurgitation. Tricuspid valve: Pulmonary artery systolic pressure: 19 mmHg. Aorta, systemic arteries: The root exhibited mild dilatation. There was 
mild dilatation of the ascending aorta. The aortic root diameter was 41 
mm. The ascending aortic AP dimension was 39 mm. Assessment ICD-10-CM ICD-9-CM 1. Atherosclerosis of native coronary artery of native heart without angina pectoris O11.52 893.74 METABOLIC PANEL, BASIC  
   CBC WITH AUTOMATED DIFF  
   LIPID PANEL  
   HEPATIC FUNCTION PANEL  
 stable 
continue treatment 2. Cardiomyopathy, unspecified type ([address] K91.4 928.9 METABOLIC PANEL, BASIC  
   CBC WITH AUTOMATED DIFF  
   LIPID PANEL  
   HEPATIC FUNCTION PANEL  
 stable 3. Essential hypertension, benign I10 401.1   
 stable 4. Hyperlipidemia, unspecified hyperlipidemia type E78.5 272.4   
 on statin 
lab with pcp 5. Aortic valve disorder I35.9 424.1   
 ai stable Patient does not want icd at this time-lv function improving There are no discontinued medications. Orders Placed This Encounter  METABOLIC PANEL, BASIC Standing Status:   Future Standing Expiration Date:   10/13/2018  CBC WITH AUTOMATED DIFF Standing Status:   Future Standing Expiration Date:   10/13/2018  LIPID PANEL Standing Status:   Future Standing Expiration Date:   3/14/2019  
 HEPATIC FUNCTION PANEL Standing Status:   Future Standing Expiration Date:   3/14/2019 Follow-up Disposition: 
Return in about 6 months (around 3/13/2019).

## 2018-10-05 LAB
A-G RATIO,AGRAT: 1.2 RATIO (ref 1.1–2.6)
ABSOLUTE LYMPHOCYTE COUNT, 10803: 0.9 K/UL (ref 1–4.8)
ALBUMIN SERPL-MCNC: 3.6 G/DL (ref 3.5–5)
ALP SERPL-CCNC: 82 U/L (ref 40–125)
ALT SERPL-CCNC: 14 U/L (ref 5–40)
ANION GAP SERPL CALC-SCNC: 11 MMOL/L
AST SERPL W P-5'-P-CCNC: 18 U/L (ref 10–37)
BASOPHILS # BLD: 0 K/UL (ref 0–0.2)
BASOPHILS NFR BLD: 0 % (ref 0–2)
BILIRUB SERPL-MCNC: 0.7 MG/DL (ref 0.2–1.2)
BILIRUBIN, DIRECT,CBIL: <0.2 MG/DL (ref 0–0.3)
BUN SERPL-MCNC: 23 MG/DL (ref 6–22)
CALCIUM SERPL-MCNC: 8.5 MG/DL (ref 8.4–10.4)
CHLORIDE SERPL-SCNC: 107 MMOL/L (ref 98–110)
CHOLEST SERPL-MCNC: 126 MG/DL (ref 110–200)
CO2 SERPL-SCNC: 24 MMOL/L (ref 20–32)
CREAT SERPL-MCNC: 1.1 MG/DL (ref 0.8–1.6)
EOSINOPHIL # BLD: 0.2 K/UL (ref 0–0.5)
EOSINOPHIL NFR BLD: 3 % (ref 0–6)
ERYTHROCYTE [DISTWIDTH] IN BLOOD BY AUTOMATED COUNT: 14.3 % (ref 10–15.5)
GFRAA, 66117: >60
GFRNA, 66118: >60
GLOBULIN,GLOB: 3.1 G/DL (ref 2–4)
GLUCOSE SERPL-MCNC: 98 MG/DL (ref 70–99)
GRANULOCYTES,GRANS: 67 % (ref 40–75)
HCT VFR BLD AUTO: 40.8 % (ref 37.8–52.2)
HDLC SERPL-MCNC: 31 MG/DL (ref 40–59)
HDLC SERPL-MCNC: 4.1 MG/DL (ref 0–5)
HGB BLD-MCNC: 13.2 G/DL (ref 12.6–17.1)
LDLC SERPL CALC-MCNC: 75 MG/DL (ref 50–99)
LYMPHOCYTES, LYMLT: 15 % (ref 20–45)
MCH RBC QN AUTO: 28 PG (ref 26–34)
MCHC RBC AUTO-ENTMCNC: 32 G/DL (ref 31–36)
MCV RBC AUTO: 86 FL (ref 80–95)
MONOCYTES # BLD: 0.8 K/UL (ref 0.1–1)
MONOCYTES NFR BLD: 14 % (ref 3–12)
NEUTROPHILS # BLD AUTO: 3.8 K/UL (ref 1.8–7.7)
PLATELET # BLD AUTO: 213 K/UL (ref 140–440)
PMV BLD AUTO: 11.4 FL (ref 9–13)
POTASSIUM SERPL-SCNC: 4.1 MMOL/L (ref 3.5–5.5)
PROT SERPL-MCNC: 6.7 G/DL (ref 6.2–8.1)
RBC # BLD AUTO: 4.75 M/UL (ref 3.8–5.8)
SODIUM SERPL-SCNC: 142 MMOL/L (ref 133–145)
TRIGL SERPL-MCNC: 103 MG/DL (ref 40–149)
VLDLC SERPL CALC-MCNC: 21 MG/DL (ref 8–30)
WBC # BLD AUTO: 5.6 K/UL (ref 4–11)

## 2018-10-08 ENCOUNTER — TELEPHONE (OUTPATIENT)
Dept: CARDIOLOGY CLINIC | Age: 73
End: 2018-10-08

## 2018-10-08 NOTE — TELEPHONE ENCOUNTER
Called and spoke with patient regarding lab/test per Dr. O[facility] [place] no significant abnormality. He voices understanding and acceptance of this advice and will call back if any further questions or concerns.

## 2018-10-08 NOTE — TELEPHONE ENCOUNTER
----- Message from Morelia Gaona MD sent at 10/6/2018 11:51 AM EDT -----  Lab/test  reviewed  No significant abnormality

## 2018-11-07 RX ORDER — CLOPIDOGREL BISULFATE 75 MG/1
TABLET ORAL
Qty: 90 TAB | Refills: 3 | Status: SHIPPED | OUTPATIENT
Start: 2018-11-07 | End: 2019-10-30 | Stop reason: SDUPTHER

## 2019-03-19 DIAGNOSIS — E78.5 HYPERLIPIDEMIA, UNSPECIFIED HYPERLIPIDEMIA TYPE: ICD-10-CM

## 2019-03-19 RX ORDER — ATORVASTATIN CALCIUM 40 MG/1
TABLET, FILM COATED ORAL
Qty: 90 TAB | Refills: 3 | Status: SHIPPED | OUTPATIENT
Start: 2019-03-19 | End: 2020-03-16

## 2019-03-29 ENCOUNTER — OFFICE VISIT (OUTPATIENT)
Dept: CARDIOLOGY CLINIC | Age: 74
End: 2019-03-29

## 2019-03-29 VITALS
HEIGHT: 71 IN | BODY MASS INDEX: 28.28 KG/M2 | SYSTOLIC BLOOD PRESSURE: 131 MMHG | DIASTOLIC BLOOD PRESSURE: 68 MMHG | HEART RATE: 64 BPM | WEIGHT: 202 LBS

## 2019-03-29 DIAGNOSIS — I42.9 CARDIOMYOPATHY, UNSPECIFIED TYPE (HCC): ICD-10-CM

## 2019-03-29 DIAGNOSIS — I25.2 OLD MYOCARDIAL INFARCTION: ICD-10-CM

## 2019-03-29 DIAGNOSIS — I10 ESSENTIAL HYPERTENSION, BENIGN: ICD-10-CM

## 2019-03-29 DIAGNOSIS — E78.5 HYPERLIPIDEMIA, UNSPECIFIED HYPERLIPIDEMIA TYPE: ICD-10-CM

## 2019-03-29 DIAGNOSIS — I25.10 ATHEROSCLEROSIS OF NATIVE CORONARY ARTERY OF NATIVE HEART WITHOUT ANGINA PECTORIS: Primary | ICD-10-CM

## 2019-03-29 DIAGNOSIS — I35.9 AORTIC VALVE DISORDER: ICD-10-CM

## 2019-03-29 NOTE — PATIENT INSTRUCTIONS
[employer] Activation Thank you for requesting access to [employer]. Please follow the instructions below to securely access and download your online medical record. [employer] allows you to send messages to your doctor, view your test results, renew your prescriptions, schedule appointments, and more. How Do I Sign Up? 1. In your internet browser, go to [email or web address]. 2. Click on the First Time User? Click Here link in the Sign In box. You will see the New Member Sign Up page. 3. Enter your [employer] Access Code exactly as it appears below. You will not need to use this code after youve completed the sign-up process. If you do not sign up before the expiration date, you must request a new code. [employer] Access Code: [email or web address] Expires: 2019  8:56 AM (This is the date your [employer] access code will ) 4. Enter the last four digits of your Social Security Number (xxxx) and Date of Birth (mm/dd/yyyy) as indicated and click Submit. You will be taken to the next sign-up page. 5. Create a [employer] ID. This will be your [employer] login ID and cannot be changed, so think of one that is secure and easy to remember. 6. Create a [employer] password. You can change your password at any time. 7. Enter your Password Reset Question and Answer. This can be used at a later time if you forget your password. 8. Enter your e-mail address. You will receive e-mail notification when new information is available in [address] 9. Click Sign Up. You can now view and download portions of your medical record. 10. Click the Download Summary menu link to download a portable copy of your medical information. Additional Information If you have questions, please visit the Frequently Asked Questions section of the [employer] website at [email or web address] Remember, [employer] is NOT to be used for urgent needs. For medical emergencies, dial 911.

## 2019-03-29 NOTE — PROGRESS NOTES
1. Have you been to the ER, urgent care clinic since your last visit? Hospitalized since your last visit? No 
 
2. Have you seen or consulted any other health care providers outside of the [address] [place] [address] since your last visit? Include any pap smears or colon screening. No  
 
3. Since your last visit, have you had any of the following symptoms? .  
 
   
 
4. Have you had any blood work, X-rays or cardiac testing? 5. Where do you normally have your labs drawn? 6. Do you need any refills today?

## 2019-03-29 NOTE — PROGRESS NOTES
HISTORY OF PRESENT ILLNESS Anita Green is a 72 y.o. male. Patient with cad,cmp,old pci. On follow up patient denies any chest pains,sob, palpitation or other significant symptoms. Valvular Heart Disease The history is provided by the patient. This is a chronic problem. The problem occurs constantly. The problem has not changed since onset. Pertinent negatives include no chest pain, no abdominal pain, no headaches and no shortness of breath. Hypertension The history is provided by the patient. This is a chronic problem. The problem occurs constantly. The problem has not changed since onset. Pertinent negatives include no chest pain, no abdominal pain, no headaches and no shortness of breath. Cholesterol Problem The history is provided by the patient. This is a chronic problem. The problem occurs constantly. The problem has not changed since onset. Pertinent negatives include no chest pain, no abdominal pain, no headaches and no shortness of breath. Review of Systems Constitutional: Negative for chills and fever. HENT: Negative for nosebleeds. Eyes: Negative for blurred vision and double vision. Respiratory: Negative for cough, hemoptysis, sputum production, shortness of breath and wheezing. Cardiovascular: Negative for chest pain, palpitations, orthopnea, claudication, leg swelling and PND. Gastrointestinal: Negative for abdominal pain, heartburn, nausea and vomiting. Musculoskeletal: Negative for myalgias. Skin: Negative for rash. Neurological: Negative for dizziness, weakness and headaches. Endo/Heme/Allergies: Does not bruise/bleed easily. No family history on file. Past Medical History:  
Diagnosis Date  Aortic valve disorders Mild to moderate.  Coronary atherosclerosis of native coronary artery Stable, old MI, old LAD, PCI.  Essential hypertension, benign Stable.  Old myocardial infarction  Other and unspecified hyperlipidemia LDL less than 100.  Other primary cardiomyopathies Past Surgical History:  
Procedure Laterality Date  HX CORONARY STENT PLACEMENT  2004 Prox LAD stent, Mid LAD PTCA No Known Allergies Current Outpatient Medications Medication Sig  
 atorvastatin (LIPITOR) 40 mg tablet take 1 tablet by mouth once daily  clopidogrel (PLAVIX) 75 mg tab take 1 tablet by mouth once daily  carvedilol (COREG) 25 mg tablet take 1 tablet by mouth twice a day with meals  lisinopril (PRINIVIL, ZESTRIL) 20 mg tablet take 1 tablet by mouth twice a day  omega-3 fatty acids (FISH OIL CONCENTRATE) cap Take  by mouth. No current facility-administered medications for this visit. Visit Vitals /68 Pulse 64 Ht 5' 11\" (1.803 m) Wt 91.6 kg (202 lb) BMI 28.17 kg/m² Physical Exam  
Constitutional: He is oriented to person, place, and time. He appears well-developed and well-nourished. HENT:  
Head: Normocephalic and atraumatic. Eyes: Conjunctivae are normal.  
Neck: Neck supple. No JVD present. No tracheal deviation present. No thyromegaly present. Cardiovascular: Normal rate, regular rhythm and normal heart sounds. Exam reveals no gallop and no friction rub. No murmur heard. Pulmonary/Chest: Breath sounds normal. No respiratory distress. He has no wheezes. He has no rales. He exhibits no tenderness. Abdominal: Soft. There is no tenderness. Musculoskeletal: He exhibits no edema. Neurological: He is alert and oriented to person, place, and time. Skin: Skin is warm and dry. Psychiatric: He has a normal mood and affect. Mr. Magdy Jacques has a reminder for a \"due or due soon\" health maintenance. I have asked that he contact his primary care provider for follow-up on this health maintenance. No flowsheet data found. Joey Mariscal Procedure information: Intravenous contrast (Definity, 3) was administered to evaluate myocardial perfusion and opacify the left ventricle. Left ventricle: The ventricle was moderately dilated. Systolic function 
was moderately reduced. Ejection fraction was estimated in the range of 30 
% to 35 %. There was moderate diffuse hypokinesis. Features were 
consistent with a pseudonormal left ventricular filling pattern, with 
concomitant abnormal relaxation and increased filling pressure (grade 2 
diastolic dysfunction). AORTIC VALVE: The valve was trileaflet. Leaflets exhibited normal 
thickness. DOPPLER: There was mild regurgitation. I Have personally reviewed recent relevant labs available and discussed with patient 8/2015-lipid,lft,8/2016 Deborha Ford Left ventricle: The ventricle was moderately dilated. Systolic function 
was mildly reduced. Ejection fraction was estimated in the range of 40 % 
to 45 %. There were no regional wall motion abnormalities. There was mild 
diffuse hypokinesis. Doppler parameters were consistent with abnormal left 
ventricular relaxation (grade 1 diastolic dysfunction). Left atrium: The atrium was mildly dilated. Mitral valve: There was mild annular calcification. There was trivial 
regurgitation. Tricuspid valve: Pulmonary artery systolic pressure: 19 mmHg. Aorta, systemic arteries: The root exhibited mild dilatation. There was 
mild dilatation of the ascending aorta. The aortic root diameter was 41 
mm. The ascending aortic AP dimension was 39 mm. I Have personally reviewed recent relevant labs available and discussed with patient Assessment ICD-10-CM ICD-9-CM 1. Atherosclerosis of native coronary artery of native heart without angina pectoris I25.10 414.01 ECHO ADULT COMPLETE Stable continue treatment 2. Cardiomyopathy, unspecified type ([address] I42.9 425.4 ECHO ADULT COMPLETE Stable compensated 3. Essential hypertension, benign I10 401.1 Stable 4. Aortic valve disorder I35.9 424.1 Aortic incompetence E.  Stable asymptomatic 5. Old myocardial infarction I25.2 412 Stable 6. Hyperlipidemia, unspecified hyperlipidemia type E78.5 272.4 LDL 75 on last lab continue treatment Patient does not want icd at this time-lv function improving There are no discontinued medications. No orders of the defined types were placed in this encounter.

## 2019-06-20 ENCOUNTER — OFFICE VISIT (OUTPATIENT)
Dept: CARDIOLOGY CLINIC | Age: 74
End: 2019-06-20

## 2019-06-20 VITALS
HEIGHT: 71 IN | BODY MASS INDEX: 27.44 KG/M2 | WEIGHT: 196 LBS | DIASTOLIC BLOOD PRESSURE: 56 MMHG | HEART RATE: 70 BPM | SYSTOLIC BLOOD PRESSURE: 119 MMHG

## 2019-06-20 DIAGNOSIS — I42.9 CARDIOMYOPATHY, UNSPECIFIED TYPE (HCC): ICD-10-CM

## 2019-06-20 DIAGNOSIS — I35.9 AORTIC VALVE DISORDER: ICD-10-CM

## 2019-06-20 DIAGNOSIS — I10 ESSENTIAL HYPERTENSION, BENIGN: ICD-10-CM

## 2019-06-20 DIAGNOSIS — I25.10 ATHEROSCLEROSIS OF NATIVE CORONARY ARTERY OF NATIVE HEART WITHOUT ANGINA PECTORIS: Primary | ICD-10-CM

## 2019-06-20 NOTE — PROGRESS NOTES
HISTORY OF PRESENT ILLNESS  Antonio Mccrary is a 72 y.o. male. Patient with cad,cmp,old pci. On follow up patient denies any chest pains,sob, palpitation or other significant symptoms. Valvular Heart Disease   The history is provided by the patient. This is a chronic problem. The problem occurs constantly. The problem has not changed since onset. Pertinent negatives include no chest pain, no abdominal pain, no headaches and no shortness of breath. Hypertension   The history is provided by the patient. This is a chronic problem. The problem occurs constantly. The problem has not changed since onset. Pertinent negatives include no chest pain, no abdominal pain, no headaches and no shortness of breath. Cholesterol Problem   The history is provided by the patient. This is a chronic problem. The problem occurs constantly. The problem has not changed since onset. Pertinent negatives include no chest pain, no abdominal pain, no headaches and no shortness of breath. Review of Systems   Constitutional: Negative for chills and fever. HENT: Negative for nosebleeds. Eyes: Negative for blurred vision and double vision. Respiratory: Negative for cough, hemoptysis, sputum production, shortness of breath and wheezing. Cardiovascular: Negative for chest pain, palpitations, orthopnea, claudication, leg swelling and PND. Gastrointestinal: Negative for abdominal pain, heartburn, nausea and vomiting. Musculoskeletal: Negative for myalgias. Skin: Negative for rash. Neurological: Negative for dizziness, weakness and headaches. Endo/Heme/Allergies: Does not bruise/bleed easily. No family history on file. Past Medical History:   Diagnosis Date    Aortic valve disorders     Mild to moderate.  Coronary atherosclerosis of native coronary artery     Stable, old MI, old LAD, PCI.  Essential hypertension, benign     Stable.     Old myocardial infarction     Other and unspecified hyperlipidemia LDL less than 100.  Other primary cardiomyopathies        Past Surgical History:   Procedure Laterality Date    HX CORONARY STENT PLACEMENT  2004    Prox LAD stent, Mid LAD PTCA       No Known Allergies    Current Outpatient Medications   Medication Sig    atorvastatin (LIPITOR) 40 mg tablet take 1 tablet by mouth once daily    clopidogrel (PLAVIX) 75 mg tab take 1 tablet by mouth once daily    carvedilol (COREG) 25 mg tablet take 1 tablet by mouth twice a day with meals    lisinopril (PRINIVIL, ZESTRIL) 20 mg tablet take 1 tablet by mouth twice a day    omega-3 fatty acids (FISH OIL CONCENTRATE) cap Take  by mouth. No current facility-administered medications for this visit. Visit Vitals  /56   Pulse 70   Ht 5' 11\" (1.803 m)   Wt 88.9 kg (196 lb)   BMI 27.34 kg/m²         Physical Exam   Constitutional: He is oriented to person, place, and time. He appears well-developed and well-nourished. HENT:   Head: Normocephalic and atraumatic. Eyes: Conjunctivae are normal.   Neck: Neck supple. No JVD present. No tracheal deviation present. No thyromegaly present. Cardiovascular: Normal rate, regular rhythm and normal heart sounds. Exam reveals no gallop and no friction rub. No murmur heard. Pulmonary/Chest: Breath sounds normal. No respiratory distress. He has no wheezes. He has no rales. He exhibits no tenderness. Abdominal: Soft. There is no tenderness. Musculoskeletal: He exhibits no edema. Neurological: He is alert and oriented to person, place, and time. Skin: Skin is warm and dry. Psychiatric: He has a normal mood and affect. Mr. Tu Fenton has a reminder for a \"due or due soon\" health maintenance. I have asked that he contact his primary care provider for follow-up on this health maintenance. No flowsheet data found.   SUMMARY:8/2014  Procedure information: Intravenous contrast (Definity, 3) was administered  to evaluate myocardial perfusion and opacify the left ventricle. Left ventricle: The ventricle was moderately dilated. Systolic function  was moderately reduced. Ejection fraction was estimated in the range of 30  % to 35 %. There was moderate diffuse hypokinesis. Features were  consistent with a pseudonormal left ventricular filling pattern, with  concomitant abnormal relaxation and increased filling pressure (grade 2  diastolic dysfunction). AORTIC VALVE: The valve was trileaflet. Leaflets exhibited normal  thickness. DOPPLER: There was mild regurgitation. I Have personally reviewed recent relevant labs available and discussed with patient  8/2015-lipid,lft,8/2016    SUMMARY:echo-8/2016  Left ventricle: The ventricle was moderately dilated. Systolic function  was mildly reduced. Ejection fraction was estimated in the range of 40 %  to 45 %. There were no regional wall motion abnormalities. There was mild  diffuse hypokinesis. Doppler parameters were consistent with abnormal left  ventricular relaxation (grade 1 diastolic dysfunction). Left atrium: The atrium was mildly dilated. Mitral valve: There was mild annular calcification. There was trivial  regurgitation. Tricuspid valve: Pulmonary artery systolic pressure: 19 mmHg. Aorta, systemic arteries: The root exhibited mild dilatation. There was  mild dilatation of the ascending aorta. The aortic root diameter was 41  mm. The ascending aortic AP dimension was 39 mm. I Have personally reviewed recent relevant labs available and discussed with patient  Interpretation Summary echocardiogram for 2019    · Left Ventricle: Mild concentric hypertrophy. Mild systolic dysfunction. Estimated left ventricular ejection fraction is 46 - 50%. Abnormal left ventricular wall motion. Mild (grade 1) left ventricular diastolic dysfunction. · Left Atrium: Mildly dilated left atrium. · Right Ventricle: Mildly dilated right ventricle. · Right Atrium: Mildly dilated right atrium.   · Aortic Valve: Mild aortic valve regurgitation is present. · Mitral Valve: Mitral valve thickening. Mild mitral valve regurgitation. · Pulmonic Valve: Mild pulmonic valve regurgitation is present. · Mildly dilated aortic root; diameter is 4.2 cm. Mildly dialted ascending aorta; diameter is 3.9 cm. Assessment       ICD-10-CM ICD-9-CM    1. Atherosclerosis of native coronary artery of native heart without angina pectoris I25.10 414.01 CBC WITH AUTOMATED DIFF      METABOLIC PANEL, BASIC      HEPATIC FUNCTION PANEL      LIPID PANEL    Stable continue current medical management   2. Cardiomyopathy, unspecified type (HCC) I42.9 425.4     Stable ejection fraction 45 to 50%. Continue current medical management   3. Essential hypertension, benign I10 401.1     Stable controlled   4. Aortic valve disorder I35.9 424.1     Mild aortic regurgitation. Stable asymptomatic   Patient does not want icd at this time-lv function improving  6/2019  Cardiac status stable. LVEF 45 to 50%. Continue treatment  There are no discontinued medications. Orders Placed This Encounter    CBC WITH AUTOMATED DIFF     Standing Status:   Future     Standing Expiration Date:   6/06/7445    METABOLIC PANEL, BASIC     Standing Status:   Future     Standing Expiration Date:   7/20/2019    HEPATIC FUNCTION PANEL     Standing Status:   Future     Standing Expiration Date:   12/19/2019    LIPID PANEL     Standing Status:   Future     Standing Expiration Date:   12/19/2019       Follow-up and Dispositions    · Return in about 6 months (around 12/20/2019).

## 2019-06-20 NOTE — PATIENT INSTRUCTIONS
[employer] Activation    Thank you for requesting access to [employer]. Please follow the instructions below to securely access and download your online medical record. [employer] allows you to send messages to your doctor, view your test results, renew your prescriptions, schedule appointments, and more. How Do I Sign Up? 1. In your internet browser, go to [email or web address]. 2. Click on the First Time User? Click Here link in the Sign In box. You will see the New Member Sign Up page. 3. Enter your [employer] Access Code exactly as it appears below. You will not need to use this code after youve completed the sign-up process. If you do not sign up before the expiration date, you must request a new code. [employer] Access Code: [email or web address]  Expires: 2019  8:35 AM (This is the date your [employer] access code will )    4. Enter the last four digits of your Social Security Number (xxxx) and Date of Birth (mm/dd/yyyy) as indicated and click Submit. You will be taken to the next sign-up page. 5. Create a [employer] ID. This will be your [employer] login ID and cannot be changed, so think of one that is secure and easy to remember. 6. Create a [employer] password. You can change your password at any time. 7. Enter your Password Reset Question and Answer. This can be used at a later time if you forget your password. 8. Enter your e-mail address. You will receive e-mail notification when new information is available in [address] 9. Click Sign Up. You can now view and download portions of your medical record. 10. Click the Download Summary menu link to download a portable copy of your medical information. Additional Information    If you have questions, please visit the Frequently Asked Questions section of the [employer] website at [email or web address] Remember, [employer] is NOT to be used for urgent needs. For medical emergencies, dial 911.

## 2019-08-06 ENCOUNTER — TELEPHONE (OUTPATIENT)
Dept: CARDIOLOGY CLINIC | Age: 74
End: 2019-08-06

## 2019-08-06 LAB
A-G RATIO,AGRAT: 1.3 RATIO (ref 1.1–2.6)
ABSOLUTE LYMPHOCYTE COUNT, 10803: 0.8 K/UL (ref 1–4.8)
ALBUMIN SERPL-MCNC: 3.8 G/DL (ref 3.5–5)
ALP SERPL-CCNC: 60 U/L (ref 40–125)
ALT SERPL-CCNC: 15 U/L (ref 5–40)
ANION GAP SERPL CALC-SCNC: 10.2 MMOL/L
AST SERPL W P-5'-P-CCNC: 22 U/L (ref 10–37)
BASOPHILS # BLD: 0 K/UL (ref 0–0.2)
BASOPHILS NFR BLD: 1 % (ref 0–2)
BILIRUB SERPL-MCNC: 0.8 MG/DL (ref 0.2–1.2)
BILIRUBIN, DIRECT,CBIL: <0.2 MG/DL (ref 0–0.3)
BUN SERPL-MCNC: 21 MG/DL (ref 6–22)
CALCIUM SERPL-MCNC: 8.9 MG/DL (ref 8.4–10.4)
CHLORIDE SERPL-SCNC: 106 MMOL/L (ref 98–110)
CHOLEST SERPL-MCNC: 131 MG/DL (ref 110–200)
CO2 SERPL-SCNC: 24 MMOL/L (ref 20–32)
CREAT SERPL-MCNC: 1 MG/DL (ref 0.8–1.6)
EOSINOPHIL # BLD: 0.2 K/UL (ref 0–0.5)
EOSINOPHIL NFR BLD: 4 % (ref 0–6)
ERYTHROCYTE [DISTWIDTH] IN BLOOD BY AUTOMATED COUNT: 15 % (ref 10–15.5)
GFRAA, 66117: >60
GFRNA, 66118: >60
GLOBULIN,GLOB: 2.9 G/DL (ref 2–4)
GLUCOSE SERPL-MCNC: 97 MG/DL (ref 70–99)
GRANULOCYTES,GRANS: 67 % (ref 40–75)
HCT VFR BLD AUTO: 41.8 % (ref 37.8–52.2)
HDLC SERPL-MCNC: 29 MG/DL (ref 40–59)
HDLC SERPL-MCNC: 4.5 MG/DL (ref 0–5)
HGB BLD-MCNC: 13.3 G/DL (ref 12.6–17.1)
LDLC SERPL CALC-MCNC: 79 MG/DL (ref 50–99)
LYMPHOCYTES, LYMLT: 15 % (ref 20–45)
MCH RBC QN AUTO: 28 PG (ref 26–34)
MCHC RBC AUTO-ENTMCNC: 32 G/DL (ref 31–36)
MCV RBC AUTO: 87 FL (ref 80–95)
MONOCYTES # BLD: 0.6 K/UL (ref 0.1–1)
MONOCYTES NFR BLD: 13 % (ref 3–12)
NEUTROPHILS # BLD AUTO: 3.3 K/UL (ref 1.8–7.7)
PLATELET # BLD AUTO: 191 K/UL (ref 140–440)
PMV BLD AUTO: 11.3 FL (ref 9–13)
POTASSIUM SERPL-SCNC: 4 MMOL/L (ref 3.5–5.5)
PROT SERPL-MCNC: 6.7 G/DL (ref 6.2–8.1)
RBC # BLD AUTO: 4.81 M/UL (ref 3.8–5.8)
SODIUM SERPL-SCNC: 140 MMOL/L (ref 133–145)
TRIGL SERPL-MCNC: 114 MG/DL (ref 40–149)
VLDLC SERPL CALC-MCNC: 23 MG/DL (ref 8–30)
WBC # BLD AUTO: 4.9 K/UL (ref 4–11)

## 2019-08-06 NOTE — TELEPHONE ENCOUNTER
----- Message from Mayo Child MD sent at 8/6/2019  1:44 PM EDT -----  Lab/test  reviewed  No significant abnormality

## 2019-08-06 NOTE — TELEPHONE ENCOUNTER
Called and spoke to patient regarding lab/test per Dr. Noa Gaona, no significant abnormality. He voices understanding and acceptance of this advice and will call back if any further questions or concerns.

## 2019-08-07 RX ORDER — LISINOPRIL 20 MG/1
TABLET ORAL
Qty: 180 TAB | Refills: 6 | Status: SHIPPED | OUTPATIENT
Start: 2019-08-07 | End: 2020-11-02

## 2019-08-13 RX ORDER — LISINOPRIL 20 MG/1
TABLET ORAL
Qty: 180 TAB | Refills: 6 | Status: SHIPPED | OUTPATIENT
Start: 2019-08-13 | End: 2019-12-17 | Stop reason: SDUPTHER

## 2019-08-30 RX ORDER — CARVEDILOL 25 MG/1
TABLET ORAL
Qty: 180 TAB | Refills: 3 | Status: SHIPPED | OUTPATIENT
Start: 2019-08-30 | End: 2020-06-01

## 2019-10-30 RX ORDER — CLOPIDOGREL BISULFATE 75 MG/1
TABLET ORAL
Qty: 90 TAB | Refills: 3 | Status: SHIPPED | OUTPATIENT
Start: 2019-10-30 | End: 2020-10-21

## 2019-12-17 ENCOUNTER — OFFICE VISIT (OUTPATIENT)
Dept: CARDIOLOGY CLINIC | Age: 74
End: 2019-12-17

## 2019-12-17 VITALS
BODY MASS INDEX: 28.7 KG/M2 | HEIGHT: 71 IN | DIASTOLIC BLOOD PRESSURE: 80 MMHG | HEART RATE: 57 BPM | SYSTOLIC BLOOD PRESSURE: 140 MMHG | OXYGEN SATURATION: 97 % | WEIGHT: 205 LBS

## 2019-12-17 DIAGNOSIS — I10 ESSENTIAL HYPERTENSION, BENIGN: ICD-10-CM

## 2019-12-17 DIAGNOSIS — I25.10 ATHEROSCLEROSIS OF NATIVE CORONARY ARTERY OF NATIVE HEART WITHOUT ANGINA PECTORIS: Primary | ICD-10-CM

## 2019-12-17 DIAGNOSIS — I35.9 AORTIC VALVE DISORDER: ICD-10-CM

## 2019-12-17 DIAGNOSIS — I42.9 CARDIOMYOPATHY, UNSPECIFIED TYPE (HCC): ICD-10-CM

## 2019-12-17 DIAGNOSIS — E78.5 HYPERLIPIDEMIA, UNSPECIFIED HYPERLIPIDEMIA TYPE: ICD-10-CM

## 2019-12-17 NOTE — PROGRESS NOTES
1. Have you been to the ER, urgent care clinic since your last visit? Hospitalized since your last visit? No    2. Have you seen or consulted any other health care providers outside of the [address] [place] [address] since your last visit? Include any pap smears or colon screening.  No

## 2019-12-17 NOTE — PATIENT INSTRUCTIONS
[employer] Activation Thank you for requesting access to [employer]. Please follow the instructions below to securely access and download your online medical record. [employer] allows you to send messages to your doctor, view your test results, renew your prescriptions, schedule appointments, and more. How Do I Sign Up? 1. In your internet browser, go to [email or web address]. 2. Click on the First Time User? Click Here link in the Sign In box. You will see the New Member Sign Up page. 3. Enter your [employer] Access Code exactly as it appears below. You will not need to use this code after youve completed the sign-up process. If you do not sign up before the expiration date, you must request a new code. [employer] Access Code: [email or web address] Expires: 2020  8:43 AM (This is the date your [employer] access code will ) 4. Enter the last four digits of your Social Security Number (xxxx) and Date of Birth (mm/dd/yyyy) as indicated and click Submit. You will be taken to the next sign-up page. 5. Create a [employer] ID. This will be your [employer] login ID and cannot be changed, so think of one that is secure and easy to remember. 6. Create a [employer] password. You can change your password at any time. 7. Enter your Password Reset Question and Answer. This can be used at a later time if you forget your password. 8. Enter your e-mail address. You will receive e-mail notification when new information is available in [address] 9. Click Sign Up. You can now view and download portions of your medical record. 10. Click the Download Summary menu link to download a portable copy of your medical information. Additional Information If you have questions, please visit the Frequently Asked Questions section of the [employer] website at [email or web address] Remember, [employer] is NOT to be used for urgent needs. For medical emergencies, dial 911.

## 2019-12-17 NOTE — PROGRESS NOTES
HISTORY OF PRESENT ILLNESS  Mark Knowles is a 69 y.o. male. Patient with cad,cmp,old pci. On follow up patient denies any chest pains,sob, palpitation or other significant symptoms. Valvular Heart Disease   The history is provided by the patient. This is a chronic problem. The problem occurs constantly. The problem has not changed since onset. Pertinent negatives include no chest pain, no abdominal pain, no headaches and no shortness of breath. Hypertension   The history is provided by the patient. This is a chronic problem. The problem occurs constantly. The problem has not changed since onset. Pertinent negatives include no chest pain, no abdominal pain, no headaches and no shortness of breath. Cholesterol Problem   The history is provided by the patient. This is a chronic problem. The problem occurs constantly. The problem has not changed since onset. Pertinent negatives include no chest pain, no abdominal pain, no headaches and no shortness of breath. Review of Systems   Constitutional: Negative for chills and fever. HENT: Negative for nosebleeds. Eyes: Negative for blurred vision and double vision. Respiratory: Negative for cough, hemoptysis, sputum production, shortness of breath and wheezing. Cardiovascular: Negative for chest pain, palpitations, orthopnea, claudication, leg swelling and PND. Gastrointestinal: Negative for abdominal pain, heartburn, nausea and vomiting. Musculoskeletal: Negative for myalgias. Skin: Negative for rash. Neurological: Negative for dizziness, weakness and headaches. Endo/Heme/Allergies: Does not bruise/bleed easily. History reviewed. No pertinent family history. Past Medical History:   Diagnosis Date    Aortic valve disorders     Mild to moderate.  Coronary atherosclerosis of native coronary artery     Stable, old MI, old LAD, PCI.  Essential hypertension, benign     Stable.     Old myocardial infarction     Other and unspecified hyperlipidemia     LDL less than 100.  Other primary cardiomyopathies        Past Surgical History:   Procedure Laterality Date    HX CORONARY STENT PLACEMENT  2004    Prox LAD stent, Mid LAD PTCA       No Known Allergies    Current Outpatient Medications   Medication Sig    clopidogrel (PLAVIX) 75 mg tab take 1 tablet by mouth once daily    carvedilol (COREG) 25 mg tablet take 1 tablet by mouth twice a day with meals    lisinopril (PRINIVIL, ZESTRIL) 20 mg tablet take 1 tablet by mouth twice a day    atorvastatin (LIPITOR) 40 mg tablet take 1 tablet by mouth once daily    omega-3 fatty acids (FISH OIL CONCENTRATE) cap Take  by mouth. No current facility-administered medications for this visit. Visit Vitals  /80 (BP 1 Location: Right arm, BP Patient Position: Sitting)   Pulse (!) 57   Ht 5' 11\" (1.803 m)   Wt 93 kg (205 lb)   SpO2 97%   BMI 28.59 kg/m²         Physical Exam   Constitutional: He is oriented to person, place, and time. He appears well-developed and well-nourished. HENT:   Head: Normocephalic and atraumatic. Eyes: Conjunctivae are normal.   Neck: Neck supple. No JVD present. No tracheal deviation present. No thyromegaly present. Cardiovascular: Normal rate, regular rhythm and normal heart sounds. Exam reveals no gallop and no friction rub. No murmur heard. Pulmonary/Chest: Breath sounds normal. No respiratory distress. He has no wheezes. He has no rales. He exhibits no tenderness. Abdominal: Soft. There is no tenderness. Musculoskeletal:         General: No edema. Neurological: He is alert and oriented to person, place, and time. Skin: Skin is warm and dry. Psychiatric: He has a normal mood and affect. Mr. Gregoria Sherwood has a reminder for a \"due or due soon\" health maintenance. I have asked that he contact his primary care provider for follow-up on this health maintenance. No flowsheet data found.   SUMMARY:8/2014  Procedure information: Intravenous contrast (Definity, 3) was administered  to evaluate myocardial perfusion and opacify the left ventricle. Left ventricle: The ventricle was moderately dilated. Systolic function  was moderately reduced. Ejection fraction was estimated in the range of 30  % to 35 %. There was moderate diffuse hypokinesis. Features were  consistent with a pseudonormal left ventricular filling pattern, with  concomitant abnormal relaxation and increased filling pressure (grade 2  diastolic dysfunction). AORTIC VALVE: The valve was trileaflet. Leaflets exhibited normal  thickness. DOPPLER: There was mild regurgitation. I Have personally reviewed recent relevant labs available and discussed with patient  8/2015-lipid,lft,8/2016    SUMMARY:echo-8/2016  Left ventricle: The ventricle was moderately dilated. Systolic function  was mildly reduced. Ejection fraction was estimated in the range of 40 %  to 45 %. There were no regional wall motion abnormalities. There was mild  diffuse hypokinesis. Doppler parameters were consistent with abnormal left  ventricular relaxation (grade 1 diastolic dysfunction). Left atrium: The atrium was mildly dilated. Mitral valve: There was mild annular calcification. There was trivial  regurgitation. Tricuspid valve: Pulmonary artery systolic pressure: 19 mmHg. Aorta, systemic arteries: The root exhibited mild dilatation. There was  mild dilatation of the ascending aorta. The aortic root diameter was 41  mm. The ascending aortic AP dimension was 39 mm. I Have personally reviewed recent relevant labs available and discussed with patient  Interpretation Summary echocardiogram for 2019    · Left Ventricle: Mild concentric hypertrophy. Mild systolic dysfunction. Estimated left ventricular ejection fraction is 46 - 50%. Abnormal left ventricular wall motion. Mild (grade 1) left ventricular diastolic dysfunction. · Left Atrium: Mildly dilated left atrium.   · Right Ventricle: Mildly dilated right ventricle. · Right Atrium: Mildly dilated right atrium. · Aortic Valve: Mild aortic valve regurgitation is present. · Mitral Valve: Mitral valve thickening. Mild mitral valve regurgitation. · Pulmonic Valve: Mild pulmonic valve regurgitation is present. · Mildly dilated aortic root; diameter is 4.2 cm. Mildly dialted ascending aorta; diameter is 3.9 cm. Assessment       ICD-10-CM ICD-9-CM    1. Atherosclerosis of native coronary artery of native heart without angina pectoris I25.10 414.01     Stable asymptomatic continue medical management   2. Cardiomyopathy, unspecified type ([address] I42.9 425.4     Stable on treatment continue therapy   3. Essential hypertension, benign I10 401.1     Elevated in office continue to monitor   4. Aortic valve disorder I35.9 424.1     Stable aortic incompetence monitor   5. Hyperlipidemia, unspecified hyperlipidemia type E78.5 272.4     Continue statin LDL control monitor   Patient does not want icd at this time-lv function improving  6/2019  Cardiac status stable. LVEF 45 to 50%. Continue treatment  Medications Discontinued During This Encounter   Medication Reason    lisinopril (PRINIVIL, ZESTRIL) 20 mg tablet Duplicate Order       No orders of the defined types were placed in this encounter. Follow-up and Dispositions    · Return in about 6 months (around 6/17/2020).

## 2020-03-15 DIAGNOSIS — E78.5 HYPERLIPIDEMIA, UNSPECIFIED HYPERLIPIDEMIA TYPE: ICD-10-CM

## 2020-03-16 RX ORDER — ATORVASTATIN CALCIUM 40 MG/1
TABLET, FILM COATED ORAL
Qty: 90 TAB | Refills: 3 | Status: SHIPPED | OUTPATIENT
Start: 2020-03-16 | End: 2021-03-16

## 2020-06-01 RX ORDER — CARVEDILOL 25 MG/1
TABLET ORAL
Qty: 180 TAB | Refills: 3 | Status: SHIPPED | OUTPATIENT
Start: 2020-06-01 | End: 2020-06-18 | Stop reason: SDUPTHER

## 2020-06-18 ENCOUNTER — OFFICE VISIT (OUTPATIENT)
Dept: CARDIOLOGY CLINIC | Age: 75
End: 2020-06-18

## 2020-06-18 VITALS
DIASTOLIC BLOOD PRESSURE: 75 MMHG | SYSTOLIC BLOOD PRESSURE: 156 MMHG | BODY MASS INDEX: 28.31 KG/M2 | TEMPERATURE: 97.1 F | HEART RATE: 61 BPM | WEIGHT: 203 LBS

## 2020-06-18 DIAGNOSIS — I25.10 ATHEROSCLEROSIS OF NATIVE CORONARY ARTERY OF NATIVE HEART WITHOUT ANGINA PECTORIS: Primary | ICD-10-CM

## 2020-06-18 DIAGNOSIS — E78.5 HYPERLIPIDEMIA, UNSPECIFIED HYPERLIPIDEMIA TYPE: ICD-10-CM

## 2020-06-18 DIAGNOSIS — I42.9 CARDIOMYOPATHY, UNSPECIFIED TYPE (HCC): ICD-10-CM

## 2020-06-18 DIAGNOSIS — I10 ESSENTIAL HYPERTENSION, BENIGN: ICD-10-CM

## 2020-06-18 DIAGNOSIS — I25.2 OLD MYOCARDIAL INFARCTION: ICD-10-CM

## 2020-06-18 DIAGNOSIS — I35.9 AORTIC VALVE DISORDER: ICD-10-CM

## 2020-06-18 RX ORDER — CARVEDILOL 25 MG/1
TABLET ORAL
Qty: 180 TAB | Refills: 3 | Status: SHIPPED | OUTPATIENT
Start: 2020-06-18 | End: 2021-08-25

## 2020-06-18 NOTE — PROGRESS NOTES
HISTORY OF PRESENT ILLNESS  Braden Horn is a 69 y.o. male. Patient with cad,cmp,old pci. On follow up patient denies any chest pains,sob, palpitation or other significant symptoms. Valvular Heart Disease   The history is provided by the patient. This is a chronic problem. The problem occurs constantly. The problem has not changed since onset. Pertinent negatives include no chest pain, no abdominal pain, no headaches and no shortness of breath. Hypertension   The history is provided by the patient. This is a chronic problem. The problem occurs constantly. The problem has not changed since onset. Pertinent negatives include no chest pain, no abdominal pain, no headaches and no shortness of breath. Cholesterol Problem   The history is provided by the patient. This is a chronic problem. The problem occurs constantly. The problem has not changed since onset. Pertinent negatives include no chest pain, no abdominal pain, no headaches and no shortness of breath. Cardiomyopathy   Pertinent negatives include no chest pain, no abdominal pain, no headaches and no shortness of breath. Review of Systems   Constitutional: Negative for chills and fever. HENT: Negative for nosebleeds. Eyes: Negative for blurred vision and double vision. Respiratory: Negative for cough, hemoptysis, sputum production, shortness of breath and wheezing. Cardiovascular: Negative for chest pain, palpitations, orthopnea, claudication, leg swelling and PND. Gastrointestinal: Negative for abdominal pain, heartburn, nausea and vomiting. Musculoskeletal: Negative for myalgias. Skin: Negative for rash. Neurological: Negative for dizziness, weakness and headaches. Endo/Heme/Allergies: Does not bruise/bleed easily. No family history on file. Past Medical History:   Diagnosis Date    Aortic valve disorders     Mild to moderate.  Coronary atherosclerosis of native coronary artery     Stable, old MI, old LAD, PCI.     Essential hypertension, benign     Stable.  Old myocardial infarction     Other and unspecified hyperlipidemia     LDL less than 100.  Other primary cardiomyopathies        Past Surgical History:   Procedure Laterality Date    HX CORONARY STENT PLACEMENT  2004    Prox LAD stent, Mid LAD PTCA       No Known Allergies    Current Outpatient Medications   Medication Sig    carvediloL (COREG) 25 mg tablet take 1 tablet by mouth twice a day with meals    atorvastatin (LIPITOR) 40 mg tablet take 1 tablet by mouth once daily    clopidogrel (PLAVIX) 75 mg tab take 1 tablet by mouth once daily    lisinopril (PRINIVIL, ZESTRIL) 20 mg tablet take 1 tablet by mouth twice a day    omega-3 fatty acids (FISH OIL CONCENTRATE) cap Take  by mouth. No current facility-administered medications for this visit. Visit Vitals  /75   Pulse 61   Temp 97.1 °F (36.2 °C)   Ht (P) 5' 11\" (1.803 m)   Wt 92.1 kg (203 lb)   BMI (P) 28.31 kg/m²         Physical Exam   Constitutional: He is oriented to person, place, and time. He appears well-developed and well-nourished. HENT:   Head: Normocephalic and atraumatic. Eyes: Conjunctivae are normal.   Neck: Neck supple. No JVD present. No tracheal deviation present. No thyromegaly present. Cardiovascular: Normal rate, regular rhythm and normal heart sounds. Exam reveals no gallop and no friction rub. No murmur heard. Pulmonary/Chest: Breath sounds normal. No respiratory distress. He has no wheezes. He has no rales. He exhibits no tenderness. Abdominal: Soft. There is no abdominal tenderness. Musculoskeletal:         General: No edema. Neurological: He is alert and oriented to person, place, and time. Skin: Skin is warm and dry. Psychiatric: He has a normal mood and affect. Mr. Joo Hernandez has a reminder for a \"due or due soon\" health maintenance. I have asked that he contact his primary care provider for follow-up on this health maintenance.     No flowsheet data found. SUMMARY:8/2014  Procedure information: Intravenous contrast (Definity, 3) was administered  to evaluate myocardial perfusion and opacify the left ventricle. Left ventricle: The ventricle was moderately dilated. Systolic function  was moderately reduced. Ejection fraction was estimated in the range of 30  % to 35 %. There was moderate diffuse hypokinesis. Features were  consistent with a pseudonormal left ventricular filling pattern, with  concomitant abnormal relaxation and increased filling pressure (grade 2  diastolic dysfunction). AORTIC VALVE: The valve was trileaflet. Leaflets exhibited normal  thickness. DOPPLER: There was mild regurgitation. I Have personally reviewed recent relevant labs available and discussed with patient  8/2015-lipid,lft,8/2016    SUMMARY:echo-8/2016  Left ventricle: The ventricle was moderately dilated. Systolic function  was mildly reduced. Ejection fraction was estimated in the range of 40 %  to 45 %. There were no regional wall motion abnormalities. There was mild  diffuse hypokinesis. Doppler parameters were consistent with abnormal left  ventricular relaxation (grade 1 diastolic dysfunction). Left atrium: The atrium was mildly dilated. Mitral valve: There was mild annular calcification. There was trivial  regurgitation. Tricuspid valve: Pulmonary artery systolic pressure: 19 mmHg. Aorta, systemic arteries: The root exhibited mild dilatation. There was  mild dilatation of the ascending aorta. The aortic root diameter was 41  mm. The ascending aortic AP dimension was 39 mm. I Have personally reviewed recent relevant labs available and discussed with patient  Interpretation Summary echocardiogram for 2019    · Left Ventricle: Mild concentric hypertrophy. Mild systolic dysfunction. Estimated left ventricular ejection fraction is 46 - 50%. Abnormal left ventricular wall motion. Mild (grade 1) left ventricular diastolic dysfunction.   · Left Atrium: Mildly dilated left atrium. · Right Ventricle: Mildly dilated right ventricle. · Right Atrium: Mildly dilated right atrium. · Aortic Valve: Mild aortic valve regurgitation is present. · Mitral Valve: Mitral valve thickening. Mild mitral valve regurgitation. · Pulmonic Valve: Mild pulmonic valve regurgitation is present. · Mildly dilated aortic root; diameter is 4.2 cm. Mildly dialted ascending aorta; diameter is 3.9 cm. Assessment       ICD-10-CM ICD-9-CM    1. Atherosclerosis of native coronary artery of native heart without angina pectoris I25.10 414.01     Stable continue current medical management   2. Cardiomyopathy, unspecified type ([address] I42.9 425.4     Continue treatment monitor   3. Aortic valve disorder I35.9 424.1     Aortic incompetence stable monitor   4. Old myocardial infarction I25.2 412     Stable asymptomatic   5. Essential hypertension, benign I10 401.1     Continue treatment monitor   6. Hyperlipidemia, unspecified hyperlipidemia type E78.5 272.4 LIPID PANEL      HEPATIC FUNCTION PANEL    Continue treatment lab with PCP   Patient does not want icd at this time-lv function improving  6/2019  Cardiac status stable. LVEF 45 to 50%. Continue treatment  Medications Discontinued During This Encounter   Medication Reason    carvediloL (COREG) 25 mg tablet Reorder       Orders Placed This Encounter    LIPID PANEL     Standing Status:   Future     Standing Expiration Date:   12/17/2020    HEPATIC FUNCTION PANEL     Standing Status:   Future     Standing Expiration Date:   12/17/2020    carvediloL (COREG) 25 mg tablet     Sig: take 1 tablet by mouth twice a day with meals     Dispense:  180 Tab     Refill:  3       Follow-up and Dispositions    · Return in about 6 months (around 12/18/2020).

## 2020-06-18 NOTE — PROGRESS NOTES
1. Have you been to the ER, urgent care clinic since your last visit? Hospitalized since your last visit?     no    2. Have you seen or consulted any other health care providers outside of the [address] [place] [address] since your last visit? Include any pap smears or colon screening. no

## 2020-10-21 RX ORDER — CLOPIDOGREL BISULFATE 75 MG/1
TABLET ORAL
Qty: 90 TAB | Refills: 3 | Status: SHIPPED | OUTPATIENT
Start: 2020-10-21 | End: 2020-10-26

## 2020-10-26 RX ORDER — CLOPIDOGREL BISULFATE 75 MG/1
TABLET ORAL
Qty: 90 TAB | Refills: 3 | Status: SHIPPED | OUTPATIENT
Start: 2020-10-26 | End: 2021-10-18

## 2020-12-09 LAB
A-G RATIO,AGRAT: 1.2 RATIO (ref 1.1–2.6)
ALBUMIN SERPL-MCNC: 3.9 G/DL (ref 3.5–5)
ALP SERPL-CCNC: 71 U/L (ref 40–125)
ALT SERPL-CCNC: 12 U/L (ref 5–40)
AST SERPL W P-5'-P-CCNC: 19 U/L (ref 10–37)
BILIRUB SERPL-MCNC: 0.7 MG/DL (ref 0.2–1.2)
BILIRUBIN, DIRECT,CBIL: <0.2 MG/DL (ref 0–0.3)
CHOLEST SERPL-MCNC: 121 MG/DL (ref 110–200)
GLOBULIN,GLOB: 3.3 G/DL (ref 2–4)
HDLC SERPL-MCNC: 3.9 MG/DL (ref 0–5)
HDLC SERPL-MCNC: 31 MG/DL
LDL/HDL RATIO,LDHD: 2.3
LDLC SERPL CALC-MCNC: 73 MG/DL (ref 50–99)
NON-HDL CHOLESTEROL, 011976: 90 MG/DL
PROT SERPL-MCNC: 7.2 G/DL (ref 6.2–8.1)
TRIGL SERPL-MCNC: 84 MG/DL (ref 40–149)
VLDLC SERPL CALC-MCNC: 17 MG/DL (ref 8–30)

## 2020-12-10 ENCOUNTER — TELEPHONE (OUTPATIENT)
Dept: CARDIOLOGY CLINIC | Age: 75
End: 2020-12-10

## 2020-12-10 NOTE — TELEPHONE ENCOUNTER
Called and left a detail message on patient voicemail regarding lab/test per Dr. Hussein Rivas, lab reviewed HDL remains low no changes. No significant abnormality. If any questions to call the office.

## 2020-12-10 NOTE — TELEPHONE ENCOUNTER
----- Message from Morelia Schroeder MD sent at 12/10/2020  7:49 AM EST -----  Lab/test  Reviewed-hdl remains low-no changes  No significant abnormality

## 2020-12-22 ENCOUNTER — OFFICE VISIT (OUTPATIENT)
Dept: CARDIOLOGY CLINIC | Age: 75
End: 2020-12-22

## 2020-12-22 VITALS
SYSTOLIC BLOOD PRESSURE: 128 MMHG | DIASTOLIC BLOOD PRESSURE: 64 MMHG | TEMPERATURE: 96.6 F | HEIGHT: 71 IN | WEIGHT: 196 LBS | BODY MASS INDEX: 27.44 KG/M2 | HEART RATE: 67 BPM

## 2020-12-22 DIAGNOSIS — I35.9 AORTIC VALVE DISORDER: ICD-10-CM

## 2020-12-22 DIAGNOSIS — I42.9 CARDIOMYOPATHY, UNSPECIFIED TYPE (HCC): ICD-10-CM

## 2020-12-22 DIAGNOSIS — E78.5 HYPERLIPIDEMIA, UNSPECIFIED HYPERLIPIDEMIA TYPE: ICD-10-CM

## 2020-12-22 DIAGNOSIS — I10 ESSENTIAL HYPERTENSION, BENIGN: ICD-10-CM

## 2020-12-22 DIAGNOSIS — I25.10 ATHEROSCLEROSIS OF NATIVE CORONARY ARTERY OF NATIVE HEART WITHOUT ANGINA PECTORIS: Primary | ICD-10-CM

## 2020-12-22 PROCEDURE — 99214 OFFICE O/P EST MOD 30 MIN: CPT | Performed by: NURSE PRACTITIONER

## 2020-12-22 NOTE — PROGRESS NOTES
HISTORY OF PRESENT ILLNESS  Larisa Quiroz is a 70 y.o. male. Patient with cad,cmp,old pci. On follow up patient denies any chest pains,sob, palpitation or other significant symptoms. Cardiomyopathy  The history is provided by the patient. This is a chronic problem. The problem occurs constantly. The problem has not changed since onset. Pertinent negatives include no chest pain, no abdominal pain, no headaches and no shortness of breath. Valvular Heart Disease  The history is provided by the patient. This is a chronic problem. The problem occurs constantly. The problem has not changed since onset. Pertinent negatives include no chest pain, no abdominal pain, no headaches and no shortness of breath. Hypertension  The history is provided by the patient. This is a chronic problem. The problem occurs constantly. The problem has not changed since onset. Pertinent negatives include no chest pain, no abdominal pain, no headaches and no shortness of breath. Cholesterol Problem  The history is provided by the patient. This is a chronic problem. The problem occurs constantly. The problem has not changed since onset. Pertinent negatives include no chest pain, no abdominal pain, no headaches and no shortness of breath. Review of Systems   Constitutional: Negative for chills and fever. HENT: Negative for nosebleeds. Eyes: Negative for blurred vision and double vision. Respiratory: Negative for cough, hemoptysis, sputum production, shortness of breath and wheezing. Cardiovascular: Negative for chest pain, palpitations, orthopnea, claudication, leg swelling and PND. Gastrointestinal: Negative for abdominal pain, heartburn, nausea and vomiting. Musculoskeletal: Negative for myalgias. Skin: Negative for rash. Neurological: Negative for dizziness, weakness and headaches. Endo/Heme/Allergies: Does not bruise/bleed easily. No family history on file.     Past Medical History:   Diagnosis Date    Aortic valve disorders     Mild to moderate.  Coronary atherosclerosis of native coronary artery     Stable, old MI, old LAD, PCI.  Essential hypertension, benign     Stable.  Old myocardial infarction     Other and unspecified hyperlipidemia     LDL less than 100.  Other primary cardiomyopathies        Past Surgical History:   Procedure Laterality Date    HX CORONARY STENT PLACEMENT  2004    Prox LAD stent, Mid LAD PTCA       No Known Allergies    Current Outpatient Medications   Medication Sig    lisinopriL (PRINIVIL, ZESTRIL) 20 mg tablet take 1 tablet by mouth twice a day    clopidogreL (PLAVIX) 75 mg tab take 1 tablet by mouth once daily    carvediloL (COREG) 25 mg tablet take 1 tablet by mouth twice a day with meals    atorvastatin (LIPITOR) 40 mg tablet take 1 tablet by mouth once daily    omega-3 fatty acids (FISH OIL CONCENTRATE) cap Take  by mouth. No current facility-administered medications for this visit. Visit Vitals  /64   Pulse 67   Temp (!) 96.6 °F (35.9 °C) (Temporal)   Ht 5' 11\" (1.803 m)   Wt 88.9 kg (196 lb)   BMI 27.34 kg/m²         Physical Exam   Constitutional: He is oriented to person, place, and time. He appears well-developed and well-nourished. HENT:   Head: Normocephalic and atraumatic. Eyes: Conjunctivae are normal.   Neck: Neck supple. No JVD present. No tracheal deviation present. No thyromegaly present. Cardiovascular: Normal rate, regular rhythm and normal heart sounds. Exam reveals no gallop and no friction rub. No murmur heard. Pulmonary/Chest: Breath sounds normal. No respiratory distress. He has no wheezes. He has no rales. He exhibits no tenderness. Abdominal: Soft. There is no abdominal tenderness. Musculoskeletal:         General: No edema. Neurological: He is alert and oriented to person, place, and time. Skin: Skin is warm and dry. Psychiatric: He has a normal mood and affect.      Mr. Lucius Knight has a reminder for a \"due or due soon\" health maintenance. I have asked that he contact his primary care provider for follow-up on this health maintenance. No flowsheet data found. SUMMARY:8/2014  Procedure information: Intravenous contrast (Definity, 3) was administered  to evaluate myocardial perfusion and opacify the left ventricle. Left ventricle: The ventricle was moderately dilated. Systolic function  was moderately reduced. Ejection fraction was estimated in the range of 30  % to 35 %. There was moderate diffuse hypokinesis. Features were  consistent with a pseudonormal left ventricular filling pattern, with  concomitant abnormal relaxation and increased filling pressure (grade 2  diastolic dysfunction). AORTIC VALVE: The valve was trileaflet. Leaflets exhibited normal  thickness. DOPPLER: There was mild regurgitation. I Have personally reviewed recent relevant labs available and discussed with patient  8/2015-lipid,lft,8/2016    SUMMARY:echo-8/2016  Left ventricle: The ventricle was moderately dilated. Systolic function  was mildly reduced. Ejection fraction was estimated in the range of 40 %  to 45 %. There were no regional wall motion abnormalities. There was mild  diffuse hypokinesis. Doppler parameters were consistent with abnormal left  ventricular relaxation (grade 1 diastolic dysfunction). Left atrium: The atrium was mildly dilated. Mitral valve: There was mild annular calcification. There was trivial  regurgitation. Tricuspid valve: Pulmonary artery systolic pressure: 19 mmHg. Aorta, systemic arteries: The root exhibited mild dilatation. There was  mild dilatation of the ascending aorta. The aortic root diameter was 41  mm. The ascending aortic AP dimension was 39 mm. I Have personally reviewed recent relevant labs available and discussed with patient  Interpretation Summary echocardiogram for 2019    · Left Ventricle: Mild concentric hypertrophy. Mild systolic dysfunction.  Estimated left ventricular ejection fraction is 46 - 50%. Abnormal left ventricular wall motion. Mild (grade 1) left ventricular diastolic dysfunction. · Left Atrium: Mildly dilated left atrium. · Right Ventricle: Mildly dilated right ventricle. · Right Atrium: Mildly dilated right atrium. · Aortic Valve: Mild aortic valve regurgitation is present. · Mitral Valve: Mitral valve thickening. Mild mitral valve regurgitation. · Pulmonic Valve: Mild pulmonic valve regurgitation is present. · Mildly dilated aortic root; diameter is 4.2 cm. Mildly dialted ascending aorta; diameter is 3.9 cm. Assessment       ICD-10-CM ICD-9-CM    1. Atherosclerosis of native coronary artery of native heart without angina pectoris  I25.10 414.01     Stable continue current medical management   2. Cardiomyopathy, unspecified type ([address]  I42.9 425.4     Continue treatment monitor   3. Aortic valve disorder  I35.9 424.1     Aortic incompetence stable monitor   4. Essential hypertension, benign  I10 401.1     Continue treatment monitor   5. Hyperlipidemia, unspecified hyperlipidemia type  E78.5 272.4     Controlled LDL 73 - continue statin   Patient does not want icd at this time-lv function improving  6/2019  Cardiac status stable. LVEF 45 to 50%. Continue treatment  There are no discontinued medications. No orders of the defined types were placed in this encounter. I have independently evaluated taken history and examined the patient. All relevant labs and testing data's are reviewed. Care plan discussed and updated after review.   Diego Abarca MD

## 2020-12-22 NOTE — PATIENT INSTRUCTIONS
Heart-Healthy Diet: Care Instructions  Your Care Instructions     A heart-healthy diet has lots of vegetables, fruits, nuts, beans, and whole grains, and is low in salt. It limits foods that are high in saturated fat, such as meats, cheeses, and fried foods. It may be hard to change your diet, but even small changes can lower your risk of heart attack and heart disease. Follow-up care is a key part of your treatment and safety. Be sure to make and go to all appointments, and call your doctor if you are having problems. It's also a good idea to know your test results and keep a list of the medicines you take. How can you care for yourself at home? Watch your portions  · Learn what a serving is. A \"serving\" and a \"portion\" are not always the same thing. Make sure that you are not eating larger portions than are recommended. For example, a serving of pasta is ½ cup. A serving size of meat is 2 to 3 ounces. A 3-ounce serving is about the size of a deck of cards. Measure serving sizes until you are good at [place]" them. Keep in mind that restaurants often serve portions that are 2 or 3 times the size of one serving. · To keep your energy level up and keep you from feeling hungry, eat often but in smaller portions. · Eat only the number of calories you need to stay at a healthy weight. If you need to lose weight, eat fewer calories than your body burns (through exercise and other physical activity). Eat more fruits and vegetables  · Eat a variety of fruit and vegetables every day. Dark green, deep orange, red, or yellow fruits and vegetables are especially good for you. Examples include spinach, carrots, peaches, and berries. · Keep carrots, celery, and other veggies handy for snacks. Buy fruit that is in season and store it where you can see it so that you will be tempted to eat it. · Cook dishes that have a lot of veggies in them, such as stir-fries and soups.   Limit saturated and trans fat  · Read food labels, and try to avoid saturated and trans fats. They increase your risk of heart disease. · Use olive or canola oil when you cook. · Bake, broil, grill, or steam foods instead of frying them. · Choose lean meats instead of high-fat meats such as hot dogs and sausages. Cut off all visible fat when you prepare meat. · Eat fish, skinless poultry, and meat alternatives such as soy products instead of high-fat meats. Soy products, such as tofu, may be especially good for your heart. · Choose low-fat or fat-free milk and dairy products. Eat foods high in fiber  · Eat a variety of grain products every day. Include whole-grain foods that have lots of fiber and nutrients. Examples of whole-grain foods include oats, whole wheat bread, and brown rice. · Buy whole-grain breads and cereals, instead of white bread or pastries. Limit salt and sodium  · Limit how much salt and sodium you eat to help lower your blood pressure. · Taste food before you salt it. Add only a little salt when you think you need it. With time, your taste buds will adjust to less salt. · Eat fewer snack items, fast foods, and other high-salt, processed foods. Check food labels for the amount of sodium in packaged foods. · Choose low-sodium versions of canned goods (such as soups, vegetables, and beans). Limit sugar  · Limit drinks and foods with added sugar. These include candy, desserts, and soda pop. Limit alcohol  · Limit alcohol to no more than 2 drinks a day for men and 1 drink a day for women. Too much alcohol can cause health problems. When should you call for help? Watch closely for changes in your health, and be sure to contact your doctor if:    · You would like help planning heart-healthy meals. Where can you learn more? Go to http://www.[employer].com/  Enter V137 in the search box to learn more about \"Heart-Healthy Diet: Care Instructions. \"  Current as of: August 22, 2019               Content Version: 12.6  © 4350-6062 [employer]. Care instructions adapted under license by [employer] (which disclaims liability or warranty for this information). If you have questions about a medical condition or this instruction, always ask your healthcare professional. [address] any warranty or liability for your use of this information.

## 2021-03-16 DIAGNOSIS — E78.5 HYPERLIPIDEMIA, UNSPECIFIED HYPERLIPIDEMIA TYPE: ICD-10-CM

## 2021-03-16 RX ORDER — ATORVASTATIN CALCIUM 40 MG/1
TABLET, FILM COATED ORAL
Qty: 90 TAB | Refills: 3 | Status: SHIPPED | OUTPATIENT
Start: 2021-03-16 | End: 2021-03-25

## 2021-03-25 DIAGNOSIS — E78.5 HYPERLIPIDEMIA, UNSPECIFIED HYPERLIPIDEMIA TYPE: ICD-10-CM

## 2021-03-25 RX ORDER — ATORVASTATIN CALCIUM 40 MG/1
TABLET, FILM COATED ORAL
Qty: 90 TAB | Refills: 3 | Status: SHIPPED | OUTPATIENT
Start: 2021-03-25

## 2021-05-10 RX ORDER — LISINOPRIL 20 MG/1
20 TABLET ORAL 2 TIMES DAILY
Qty: 180 TAB | Refills: 3 | Status: SHIPPED | OUTPATIENT
Start: 2021-05-10

## 2021-06-22 ENCOUNTER — OFFICE VISIT (OUTPATIENT)
Dept: CARDIOLOGY CLINIC | Age: 76
End: 2021-06-22
Payer: MEDICARE

## 2021-06-22 VITALS
SYSTOLIC BLOOD PRESSURE: 138 MMHG | DIASTOLIC BLOOD PRESSURE: 70 MMHG | WEIGHT: 200 LBS | HEART RATE: 61 BPM | HEIGHT: 71 IN | BODY MASS INDEX: 28 KG/M2

## 2021-06-22 DIAGNOSIS — I35.9 AORTIC VALVE DISORDER: ICD-10-CM

## 2021-06-22 DIAGNOSIS — I25.2 OLD MYOCARDIAL INFARCTION: ICD-10-CM

## 2021-06-22 DIAGNOSIS — I42.9 CARDIOMYOPATHY, UNSPECIFIED TYPE (HCC): ICD-10-CM

## 2021-06-22 DIAGNOSIS — I10 ESSENTIAL HYPERTENSION, BENIGN: ICD-10-CM

## 2021-06-22 DIAGNOSIS — I25.10 ATHEROSCLEROSIS OF NATIVE CORONARY ARTERY OF NATIVE HEART WITHOUT ANGINA PECTORIS: Primary | ICD-10-CM

## 2021-06-22 PROCEDURE — 1101F PT FALLS ASSESS-DOCD LE1/YR: CPT | Performed by: INTERNAL MEDICINE

## 2021-06-22 PROCEDURE — G8427 DOCREV CUR MEDS BY ELIG CLIN: HCPCS | Performed by: INTERNAL MEDICINE

## 2021-06-22 PROCEDURE — G8419 CALC BMI OUT NRM PARAM NOF/U: HCPCS | Performed by: INTERNAL MEDICINE

## 2021-06-22 PROCEDURE — 99214 OFFICE O/P EST MOD 30 MIN: CPT | Performed by: INTERNAL MEDICINE

## 2021-06-22 PROCEDURE — G8432 DEP SCR NOT DOC, RNG: HCPCS | Performed by: INTERNAL MEDICINE

## 2021-06-22 PROCEDURE — G8536 NO DOC ELDER MAL SCRN: HCPCS | Performed by: INTERNAL MEDICINE

## 2021-06-22 PROCEDURE — G8754 DIAS BP LESS 90: HCPCS | Performed by: INTERNAL MEDICINE

## 2021-06-22 PROCEDURE — 3017F COLORECTAL CA SCREEN DOC REV: CPT | Performed by: INTERNAL MEDICINE

## 2021-06-22 PROCEDURE — G8752 SYS BP LESS 140: HCPCS | Performed by: INTERNAL MEDICINE

## 2021-06-22 NOTE — PROGRESS NOTES
1. Have you been to the ER, urgent care clinic since your last visit? Hospitalized since your last visit?     no    2. Have you seen or consulted any other health care providers outside of the [address] [place] [address] since your last visit? Include any pap smears or colon screening.       No

## 2021-06-22 NOTE — PROGRESS NOTES
HISTORY OF PRESENT ILLNESS  Igor Collier is a 70 y.o. male. Patient with cad,cmp,old pci. On follow up patient denies any chest pains,sob, palpitation or other significant symptoms. Cardiomyopathy  Pertinent negatives include no chest pain, no abdominal pain, no headaches and no shortness of breath. Valvular Heart Disease  The history is provided by the patient. This is a chronic problem. The problem occurs constantly. The problem has not changed since onset. Pertinent negatives include no chest pain, no abdominal pain, no headaches and no shortness of breath. Hypertension  The history is provided by the patient. This is a chronic problem. The problem occurs constantly. The problem has not changed since onset. Pertinent negatives include no chest pain, no abdominal pain, no headaches and no shortness of breath. Cholesterol Problem  The history is provided by the patient. This is a chronic problem. The problem occurs constantly. The problem has not changed since onset. Pertinent negatives include no chest pain, no abdominal pain, no headaches and no shortness of breath. Review of Systems   Constitutional: Negative for chills and fever. HENT: Negative for nosebleeds. Eyes: Negative for blurred vision and double vision. Respiratory: Negative for cough, hemoptysis, sputum production, shortness of breath and wheezing. Cardiovascular: Negative for chest pain, palpitations, orthopnea, claudication, leg swelling and PND. Gastrointestinal: Negative for abdominal pain, heartburn, nausea and vomiting. Musculoskeletal: Negative for myalgias. Skin: Negative for rash. Neurological: Negative for dizziness, weakness and headaches. Endo/Heme/Allergies: Does not bruise/bleed easily. No family history on file. Past Medical History:   Diagnosis Date    Aortic valve disorders     Mild to moderate.  Coronary atherosclerosis of native coronary artery     Stable, old MI, old LAD, PCI.     Essential hypertension, benign     Stable.  Old myocardial infarction     Other and unspecified hyperlipidemia     LDL less than 100.  Other primary cardiomyopathies        Past Surgical History:   Procedure Laterality Date    HX CORONARY STENT PLACEMENT  2004    Prox LAD stent, Mid LAD PTCA       No Known Allergies    Current Outpatient Medications   Medication Sig    lisinopriL (PRINIVIL, ZESTRIL) 20 mg tablet Take 1 Tab by mouth two (2) times a day.  atorvastatin (LIPITOR) 40 mg tablet take 1 tablet by mouth once daily    clopidogreL (PLAVIX) 75 mg tab take 1 tablet by mouth once daily    carvediloL (COREG) 25 mg tablet take 1 tablet by mouth twice a day with meals    omega-3 fatty acids (FISH OIL CONCENTRATE) cap Take  by mouth. No current facility-administered medications for this visit. Visit Vitals  /70   Pulse 61   Ht 5' 11\" (1.803 m)   Wt 90.7 kg (200 lb)   BMI 27.89 kg/m²         Physical Exam  Constitutional:       Appearance: He is well-developed. HENT:      Head: Normocephalic and atraumatic. Eyes:      Conjunctiva/sclera: Conjunctivae normal.   Neck:      Thyroid: No thyromegaly. Vascular: No JVD. Trachea: No tracheal deviation. Cardiovascular:      Rate and Rhythm: Normal rate and regular rhythm. Heart sounds: Normal heart sounds. No murmur heard. No friction rub. No gallop. Pulmonary:      Effort: No respiratory distress. Breath sounds: Normal breath sounds. No wheezing or rales. Chest:      Chest wall: No tenderness. Abdominal:      Palpations: Abdomen is soft. Tenderness: There is no abdominal tenderness. Musculoskeletal:      Cervical back: Neck supple. Skin:     General: Skin is warm and dry. Neurological:      Mental Status: He is alert and oriented to person, place, and time. Mr. Ruddy Murdock has a reminder for a \"due or due soon\" health maintenance.  I have asked that he contact his primary care provider for follow-up on this health maintenance. No flowsheet data found. SUMMARY:8/2014  Procedure information: Intravenous contrast (Definity, 3) was administered  to evaluate myocardial perfusion and opacify the left ventricle. Left ventricle: The ventricle was moderately dilated. Systolic function  was moderately reduced. Ejection fraction was estimated in the range of 30  % to 35 %. There was moderate diffuse hypokinesis. Features were  consistent with a pseudonormal left ventricular filling pattern, with  concomitant abnormal relaxation and increased filling pressure (grade 2  diastolic dysfunction). AORTIC VALVE: The valve was trileaflet. Leaflets exhibited normal  thickness. DOPPLER: There was mild regurgitation. I Have personally reviewed recent relevant labs available and discussed with patient  8/2015-lipid,lft,8/2016    SUMMARY:echo-8/2016  Left ventricle: The ventricle was moderately dilated. Systolic function  was mildly reduced. Ejection fraction was estimated in the range of 40 %  to 45 %. There were no regional wall motion abnormalities. There was mild  diffuse hypokinesis. Doppler parameters were consistent with abnormal left  ventricular relaxation (grade 1 diastolic dysfunction). Left atrium: The atrium was mildly dilated. Mitral valve: There was mild annular calcification. There was trivial  regurgitation. Tricuspid valve: Pulmonary artery systolic pressure: 19 mmHg. Aorta, systemic arteries: The root exhibited mild dilatation. There was  mild dilatation of the ascending aorta. The aortic root diameter was 41  mm. The ascending aortic AP dimension was 39 mm. I Have personally reviewed recent relevant labs available and discussed with patient  Interpretation Summary echocardiogram for 2019    · Left Ventricle: Mild concentric hypertrophy. Mild systolic dysfunction. Estimated left ventricular ejection fraction is 46 - 50%. Abnormal left ventricular wall motion.  Mild (grade 1) left ventricular diastolic dysfunction. · Left Atrium: Mildly dilated left atrium. · Right Ventricle: Mildly dilated right ventricle. · Right Atrium: Mildly dilated right atrium. · Aortic Valve: Mild aortic valve regurgitation is present. · Mitral Valve: Mitral valve thickening. Mild mitral valve regurgitation. · Pulmonic Valve: Mild pulmonic valve regurgitation is present. · Mildly dilated aortic root; diameter is 4.2 cm. Mildly dialted ascending aorta; diameter is 3.9 cm. Assessment       ICD-10-CM ICD-9-CM    1. Atherosclerosis of native coronary artery of native heart without angina pectoris  I25.10 414.01     Stable continue treatment monitor   2. Cardiomyopathy, unspecified type ([address]  I42.9 425.4     Stable asymptomatic continue treatment   3. Aortic valve disorder  I35.9 424.1     Stable monitor   4. Essential hypertension, benign  I10 401.1     Stable continue treatment   5. Old myocardial infarction  I25.2 412     Stable asymptomatic   Patient does not want icd at this time-lv function improving  6/2019  Cardiac status stable. LVEF 45 to 50%. Continue treatment  6/2021  Stable cardiac status. Symptoms stable continue current medical management        There are no discontinued medications. No orders of the defined types were placed in this encounter. Follow-up and Dispositions    · Return in about 6 months (around 12/22/2021).

## 2021-08-25 RX ORDER — CARVEDILOL 25 MG/1
TABLET ORAL
Qty: 180 TABLET | Refills: 3 | Status: SHIPPED | OUTPATIENT
Start: 2021-08-25

## 2021-10-18 ENCOUNTER — APPOINTMENT (OUTPATIENT)
Dept: GENERAL RADIOLOGY | Age: 76
End: 2021-10-18
Attending: EMERGENCY MEDICINE
Payer: MEDICARE

## 2021-10-18 ENCOUNTER — HOSPITAL ENCOUNTER (EMERGENCY)
Age: 76
Discharge: ANOTHER HOSPITAL | End: 2021-10-18
Attending: EMERGENCY MEDICINE
Payer: MEDICARE

## 2021-10-18 VITALS
HEIGHT: 68 IN | RESPIRATION RATE: 18 BRPM | BODY MASS INDEX: 30.31 KG/M2 | TEMPERATURE: 96.6 F | HEART RATE: 59 BPM | OXYGEN SATURATION: 94 % | WEIGHT: 200 LBS | SYSTOLIC BLOOD PRESSURE: 204 MMHG | DIASTOLIC BLOOD PRESSURE: 102 MMHG

## 2021-10-18 DIAGNOSIS — I62.9 INTRACRANIAL BLEED (HCC): Primary | ICD-10-CM

## 2021-10-18 LAB
ALBUMIN SERPL-MCNC: 3.8 G/DL (ref 3.5–4.7)
ALBUMIN/GLOB SERPL: 1.1 {RATIO}
ALP SERPL-CCNC: 55 U/L (ref 38–126)
ALT SERPL-CCNC: 19 U/L (ref 3–72)
ANION GAP SERPL CALC-SCNC: 8 MMOL/L
APPEARANCE UR: CLEAR
APTT PPP: 25 SEC (ref 23–36.4)
AST SERPL W P-5'-P-CCNC: 26 U/L (ref 17–74)
BACTERIA URNS QL MICRO: ABNORMAL /HPF
BASOPHILS # BLD: 0 K/UL (ref 0–0.1)
BASOPHILS NFR BLD: 0 % (ref 0–2)
BILIRUB SERPL-MCNC: 0.8 MG/DL (ref 0.2–1)
BILIRUB UR QL: NEGATIVE
BUN SERPL-MCNC: 23 MG/DL (ref 9–21)
BUN/CREAT SERPL: 21
CA-I BLD-MCNC: 9 MG/DL (ref 8.5–10.5)
CHLORIDE SERPL-SCNC: 102 MMOL/L (ref 94–111)
CO2 SERPL-SCNC: 24 MMOL/L (ref 21–33)
COLOR UR: YELLOW
CREAT SERPL-MCNC: 1.1 MG/DL (ref 0.8–1.5)
DIFFERENTIAL METHOD BLD: ABNORMAL
EOSINOPHIL # BLD: 0.2 K/UL (ref 0–0.4)
EOSINOPHIL NFR BLD: 4 % (ref 0–5)
EPITH CASTS URNS QL MICRO: ABNORMAL /LPF (ref 0–20)
ERYTHROCYTE [DISTWIDTH] IN BLOOD BY AUTOMATED COUNT: 14 % (ref 11.6–14.5)
GLOBULIN SER CALC-MCNC: 3.6 G/DL
GLUCOSE SERPL-MCNC: 140 MG/DL (ref 70–110)
GLUCOSE UR STRIP.AUTO-MCNC: NEGATIVE MG/DL
HCT VFR BLD AUTO: 45.9 % (ref 36–48)
HGB BLD-MCNC: 14.7 G/DL (ref 13–16)
HGB UR QL STRIP: NEGATIVE
IMM GRANULOCYTES # BLD AUTO: 0 K/UL (ref 0–0.04)
IMM GRANULOCYTES NFR BLD AUTO: 0 % (ref 0–0.5)
INR PPP: 1 (ref 0.8–1.2)
KETONES UR QL STRIP.AUTO: NEGATIVE MG/DL
LEUKOCYTE ESTERASE UR QL STRIP.AUTO: NEGATIVE
LYMPHOCYTES # BLD: 1 K/UL (ref 0.9–3.6)
LYMPHOCYTES NFR BLD: 15 % (ref 21–52)
MAGNESIUM SERPL-MCNC: 1.7 MG/DL (ref 1.7–2.8)
MCH RBC QN AUTO: 28.3 PG (ref 24–34)
MCHC RBC AUTO-ENTMCNC: 32 G/DL (ref 31–37)
MCV RBC AUTO: 88.3 FL (ref 78–100)
MONOCYTES # BLD: 0.7 K/UL (ref 0.05–1.2)
MONOCYTES NFR BLD: 10 % (ref 3–10)
NEUTS SEG # BLD: 4.9 K/UL (ref 1.8–8)
NEUTS SEG NFR BLD: 71 % (ref 40–73)
NITRITE UR QL STRIP.AUTO: NEGATIVE
NRBC # BLD: 0 K/UL (ref 0–0.01)
NRBC BLD-RTO: 0 PER 100 WBC
PH UR STRIP: 5 [PH] (ref 5–8)
PLATELET # BLD AUTO: 238 K/UL (ref 135–420)
PMV BLD AUTO: 11.6 FL (ref 9.2–11.8)
POTASSIUM SERPL-SCNC: 4.1 MMOL/L (ref 3.2–5.1)
PROT SERPL-MCNC: 7.4 G/DL (ref 6.1–8.4)
PROT UR STRIP-MCNC: ABNORMAL MG/DL
PROTHROMBIN TIME: 13 SEC (ref 11.5–15.2)
RBC # BLD AUTO: 5.2 M/UL (ref 4.35–5.65)
RBC #/AREA URNS HPF: ABNORMAL /HPF (ref 0–2)
SODIUM SERPL-SCNC: 134 MMOL/L (ref 135–145)
SP GR UR REFRACTOMETRY: 1.01 (ref 1–1.03)
THERAPEUTIC RANGE,PTTT: NORMAL SEC (ref 82–109)
TROPONIN I SERPL-MCNC: <0.02 NG/ML (ref 0.02–0.05)
UROBILINOGEN UR QL STRIP.AUTO: 0.2 EU/DL (ref 0.2–1)
WBC # BLD AUTO: 6.9 K/UL (ref 4.6–13.2)
WBC URNS QL MICRO: ABNORMAL /HPF (ref 0–4)

## 2021-10-18 PROCEDURE — 93005 ELECTROCARDIOGRAM TRACING: CPT

## 2021-10-18 PROCEDURE — 85610 PROTHROMBIN TIME: CPT

## 2021-10-18 PROCEDURE — 85025 COMPLETE CBC W/AUTO DIFF WBC: CPT

## 2021-10-18 PROCEDURE — 85730 THROMBOPLASTIN TIME PARTIAL: CPT

## 2021-10-18 PROCEDURE — 31500 INSERT EMERGENCY AIRWAY: CPT

## 2021-10-18 PROCEDURE — 80053 COMPREHEN METABOLIC PANEL: CPT

## 2021-10-18 PROCEDURE — 96375 TX/PRO/DX INJ NEW DRUG ADDON: CPT

## 2021-10-18 PROCEDURE — 96365 THER/PROPH/DIAG IV INF INIT: CPT

## 2021-10-18 PROCEDURE — 74011250636 HC RX REV CODE- 250/636: Performed by: EMERGENCY MEDICINE

## 2021-10-18 PROCEDURE — 84484 ASSAY OF TROPONIN QUANT: CPT

## 2021-10-18 PROCEDURE — 71045 X-RAY EXAM CHEST 1 VIEW: CPT

## 2021-10-18 PROCEDURE — 81001 URINALYSIS AUTO W/SCOPE: CPT

## 2021-10-18 PROCEDURE — 74011000258 HC RX REV CODE- 258: Performed by: EMERGENCY MEDICINE

## 2021-10-18 PROCEDURE — 83735 ASSAY OF MAGNESIUM: CPT

## 2021-10-18 PROCEDURE — 99285 EMERGENCY DEPT VISIT HI MDM: CPT

## 2021-10-18 PROCEDURE — 74011000250 HC RX REV CODE- 250: Performed by: EMERGENCY MEDICINE

## 2021-10-18 RX ORDER — LIDOCAINE HYDROCHLORIDE 20 MG/ML
INJECTION, SOLUTION INFILTRATION; PERINEURAL
Status: DISCONTINUED
Start: 2021-10-18 | End: 2021-10-18 | Stop reason: WASHOUT

## 2021-10-18 RX ORDER — ETOMIDATE 2 MG/ML
INJECTION INTRAVENOUS
Status: DISCONTINUED
Start: 2021-10-18 | End: 2021-10-18 | Stop reason: HOSPADM

## 2021-10-18 RX ORDER — CLOPIDOGREL BISULFATE 75 MG/1
TABLET ORAL
Qty: 90 TABLET | Refills: 3 | Status: SHIPPED | OUTPATIENT
Start: 2021-10-18

## 2021-10-18 RX ORDER — SUCCINYLCHOLINE CHLORIDE 100 MG/5ML
SYRINGE (ML) INTRAVENOUS
Status: DISCONTINUED
Start: 2021-10-18 | End: 2021-10-18 | Stop reason: WASHOUT

## 2021-10-18 RX ORDER — PROPOFOL 10 MG/ML
5 VIAL (ML) INTRAVENOUS
Status: CANCELLED | OUTPATIENT
Start: 2021-10-18

## 2021-10-18 RX ORDER — PROPOFOL 10 MG/ML
5 VIAL (ML) INTRAVENOUS
Status: DISCONTINUED | OUTPATIENT
Start: 2021-10-18 | End: 2021-10-18 | Stop reason: HOSPADM

## 2021-10-18 RX ORDER — MANNITOL 20 G/100ML
1 INJECTION, SOLUTION INTRAVENOUS
Status: COMPLETED | OUTPATIENT
Start: 2021-10-18 | End: 2021-10-18

## 2021-10-18 RX ORDER — PROPOFOL 10 MG/ML
5 VIAL (ML) INTRAVENOUS
Status: DISCONTINUED | OUTPATIENT
Start: 2021-10-18 | End: 2021-10-18

## 2021-10-18 RX ORDER — ROCURONIUM BROMIDE 10 MG/ML
INJECTION, SOLUTION INTRAVENOUS
Status: DISCONTINUED
Start: 2021-10-18 | End: 2021-10-18 | Stop reason: HOSPADM

## 2021-10-18 RX ADMIN — MANNITOL 90.7 G: 20 INJECTION, SOLUTION INTRAVENOUS at 10:59

## 2021-10-18 RX ADMIN — PROPOFOL 5 MCG/KG/MIN: 10 INJECTION, EMULSION INTRAVENOUS at 10:25

## 2021-10-18 RX ADMIN — DEXTROSE 2 MG/HR: 5 SOLUTION INTRAVENOUS at 11:07
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
## 2021-10-18 NOTE — ED NOTES
0134 stroke alert called  0945 pt arrived via EMS. 6122 pt taken to CT  Upon return from CT, pt was no longer responsive to any stimuli. MD called to bedside, pt moved to trauma room. Prepped pt to intubate. 1015 20mg of etomidate given, 50mg rocuronium given per Dr Garciaa Days verbal order. Pt intubated 7.0 ETT at 27 at the lip. 14F NG placed, confirmed with xray. Michael placed. Propofol drip initiated per MAR,  Nicardipene initiated per MAR,  Mannitol infusion initiated per MAR.
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
## 2021-10-18 NOTE — ED NOTES
Report given to [facility] Marie Kraft RN. Pt transported to [facility] ICU via [facility].

## 2021-10-18 NOTE — PROGRESS NOTES
Patient intubated for airway protection and transfer for intracranial bleed.     ETT lot # [id number]       10/18/21 1035   Patient Observations   Pulse (Heart Rate) (!) 59   Resp Rate 18   O2 Sat (%) 94 %   Airway - Endotracheal Tube 10/18/21 Oral   Placement Date/Time: 10/18/21 1025   Inserted By: Dr. Josh Cottrell  Location: Oral  Placement Verified: Auscultation  Airway Tube Size: 7 mm  Anesthesia ETT Insertion Depth: 23 cm  Anesthesia ETT Line Reji: Lips   Insertion Depth (cm) 23 cm   Line Reji Lips   Cuff Pressure   (MLT)   Ventilator Initiate/Discontinue   Ventilator Initiate Yes  (ETT lot [id number])   Vent Settings   FIO2 (%) 50 %   SpO2/FIO2 Ratio 188   CMV Rate Set 18   Back-Up Rate 18   Vt Set (ml) 450 ml   PEEP/VENT (cm H2O) 5 cm H20   Flow Trigger 3   Ventilator Measurements   Resp Rate Observed 18   Vt Exhaled (Machine Breath) (ml) 435 ml   Ve Observed (l/min) 7.25 l/min   PIP Observed (cm H2O) 22 cm H2O   Safety & Alarms   Backup Mode Checked/Apnea Yes   Pressure Max 50 cm H2O   Ve Min 2   Ve Max 20   Vt Min 200 ml   Vt Max 900 ml   RR Max 40   Ambu Bag Yes   Ambu Mask Yes   Vent Method/Mode   Ventilation Method Conventional   Ventilator Mode Assist control   $$ Ventilator Initial Initial Vent Day

## 2021-10-18 NOTE — ED NOTES
Report called to Enrique Smalls RN  At [facility] Neuro ICU, discussed pt diagnostics, pt symptoms, ED summary, medications. Emtala completed.

## 2021-10-19 LAB
ATRIAL RATE: 61 BPM
CALCULATED P AXIS, ECG09: 49 DEGREES
CALCULATED R AXIS, ECG10: 36 DEGREES
CALCULATED T AXIS, ECG11: 93 DEGREES
DIAGNOSIS, 93000: NORMAL
P-R INTERVAL, ECG05: 166 MS
Q-T INTERVAL, ECG07: 426 MS
QRS DURATION, ECG06: 95 MS
QTC CALCULATION (BEZET), ECG08: 429 MS
VENTRICULAR RATE, ECG03: 61 BPM